# Patient Record
Sex: FEMALE | Race: WHITE | Employment: FULL TIME | URBAN - METROPOLITAN AREA
[De-identification: names, ages, dates, MRNs, and addresses within clinical notes are randomized per-mention and may not be internally consistent; named-entity substitution may affect disease eponyms.]

---

## 2017-01-24 ENCOUNTER — OFFICE VISIT (OUTPATIENT)
Dept: SURGERY | Facility: CLINIC | Age: 42
End: 2017-01-24

## 2017-01-24 VITALS
TEMPERATURE: 98 F | HEART RATE: 85 BPM | DIASTOLIC BLOOD PRESSURE: 82 MMHG | RESPIRATION RATE: 16 BRPM | WEIGHT: 173.63 LBS | BODY MASS INDEX: 27.91 KG/M2 | HEIGHT: 66 IN | SYSTOLIC BLOOD PRESSURE: 123 MMHG

## 2017-01-24 DIAGNOSIS — R92.8 ABNORMAL MAMMOGRAM OF RIGHT BREAST: Primary | ICD-10-CM

## 2017-01-24 PROCEDURE — 99244 OFF/OP CNSLTJ NEW/EST MOD 40: CPT | Performed by: SURGERY

## 2017-01-24 NOTE — PROGRESS NOTES
Breast Surgery New Patient Consultation    This is the first visit for this 37year old woman, referred by Katy Rashid, who presents for evaluation of abnormal breast imaging.     History of Present Illness:   Ms. Vielka Guzman is a 37year old woman wh Benjamin Bains*    Rash    Comment:CAPS    Family History:   Family History   Problem Relation Age of Onset   • Acute MI[other] [OTHER] Maternal Grandfather    • alzheimer's disease[other] [OTHER] Maternal Grandmother    • emphysema[other] [OTHER] Paternal Gra vomiting, dark or bloody stools, constipation, yellowing of the skin, indigestion, nausea, change in bowel habits, diarrhea, abdominal pain or vomiting blood.      Genitourinary:  The patient denies +frequent urination, needing to get up at night to urinate midline. Conjunctiva are clear, non-icteric. Chest: The chest expands symmetrically. The lungs are clear to auscultation. Heart: The rhythm is regular. There are no murmurs, rubs, gallops or thrills.     Breasts:  Her breasts are symmetrical.  Right 2017 to restart an annual screening regimen. I explained that the 3D will improve accuracy and decrease the recall rate. She was given ample opportunity for questions and those questions were answered to her satisfaction.  She has been  encouraged to contac

## 2017-01-24 NOTE — PROGRESS NOTES
Patient presents with:  Consult: New consult, referred by Qi GARCIA for abnormal mammogram    Verified and updated allergy and medication list.     LMP: 01/12/17      Age of menarche: 16  Number of pregnancies:2  Age at 4st full term delivery: 33

## 2017-03-15 ENCOUNTER — OFFICE VISIT (OUTPATIENT)
Dept: FAMILY MEDICINE CLINIC | Facility: CLINIC | Age: 42
End: 2017-03-15

## 2017-03-15 VITALS
BODY MASS INDEX: 26.68 KG/M2 | RESPIRATION RATE: 16 BRPM | TEMPERATURE: 98 F | WEIGHT: 170 LBS | HEART RATE: 100 BPM | HEIGHT: 67 IN | OXYGEN SATURATION: 99 % | SYSTOLIC BLOOD PRESSURE: 114 MMHG | DIASTOLIC BLOOD PRESSURE: 74 MMHG

## 2017-03-15 DIAGNOSIS — J02.9 SORE THROAT: ICD-10-CM

## 2017-03-15 DIAGNOSIS — J02.9 PHARYNGITIS, UNSPECIFIED ETIOLOGY: Primary | ICD-10-CM

## 2017-03-15 LAB — CONTROL LINE PRESENT WITH A CLEAR BACKGROUND (YES/NO): YES YES/NO

## 2017-03-15 PROCEDURE — 87880 STREP A ASSAY W/OPTIC: CPT | Performed by: NURSE PRACTITIONER

## 2017-03-15 PROCEDURE — 87081 CULTURE SCREEN ONLY: CPT | Performed by: NURSE PRACTITIONER

## 2017-03-15 PROCEDURE — 99213 OFFICE O/P EST LOW 20 MIN: CPT | Performed by: NURSE PRACTITIONER

## 2017-03-15 NOTE — PROGRESS NOTES
CHIEF COMPLAINT:   Patient presents with:  Sore Throat: body aches        HPI:   Ellie Goldsmith is a 37year old female presents to clinic with complaint of sore throat. Patient has had for 1 days. Symptoms have been unchanged since onset.   Patient report THROAT: oral mucosa pink, moist. Posterior pharynx erythematous and injected. no exudates. Tonsils 1/4. Breath not malodorous   NECK: supple  LUNGS: clear to auscultation bilaterally, no wheezes or rhonchi. Breathing is non labored.   CARDIO: RRR without m A test has been done to find out whether you (or your child, if your child is the patient) have strep throat. Call this facility or your healthcare provider if you were not given your test results.  If the test is positive for strep infection, you will need · Use throat lozenges or numbing throat sprays to help reduce pain. Gargling with warm salt water will also help reduce throat pain. For this, dissolve 1/2 teaspoon of salt in 1 glass of warm water.  To help soothe a sore throat, children can sip on juice o

## 2017-03-15 NOTE — PATIENT INSTRUCTIONS
Pharyngitis (Sore Throat), Report Pending    Pharyngitis (sore throat) is often due to a virus. It can also be caused by the streptococcus, or strep, bacterium, often called strep throat.  Both viral and strep infections can cause throat pain that is wors · For children: Use acetaminophen for fever, fussiness, or discomfort.  In infants older than 10months of age, you may use ibuprofen instead of acetaminophen. Talk with your child's healthcare provider before giving these medicines if your child has chronic · Signs of dehydration (very dark urine or no urine, sunken eyes, dizziness)  · Trouble breathing or noisy breathing  · Muffled voice  · New rash  · Child appears to be getting sicker  Date Last Reviewed: 4/13/2015  © 2520-4912 [employer] [address]. 7

## 2017-03-24 ENCOUNTER — OFFICE VISIT (OUTPATIENT)
Dept: FAMILY MEDICINE CLINIC | Facility: CLINIC | Age: 42
End: 2017-03-24

## 2017-03-24 VITALS
RESPIRATION RATE: 18 BRPM | WEIGHT: 170 LBS | SYSTOLIC BLOOD PRESSURE: 120 MMHG | DIASTOLIC BLOOD PRESSURE: 66 MMHG | TEMPERATURE: 98 F | OXYGEN SATURATION: 98 % | HEART RATE: 79 BPM | HEIGHT: 66.5 IN | BODY MASS INDEX: 27 KG/M2

## 2017-03-24 DIAGNOSIS — R35.0 FREQUENCY OF URINATION: ICD-10-CM

## 2017-03-24 DIAGNOSIS — N30.01 ACUTE CYSTITIS WITH HEMATURIA: Primary | ICD-10-CM

## 2017-03-24 LAB
APPEARANCE: CLEAR
PH, URINE: 5.5 (ref 4.5–8)
SPECIFIC GRAVITY: 1.02 (ref 1–1.03)
URINE-COLOR: YELLOW
UROBILINOGEN,SEMI-QN: 0.2 MG/DL (ref 0–1.9)

## 2017-03-24 PROCEDURE — 99213 OFFICE O/P EST LOW 20 MIN: CPT | Performed by: FAMILY MEDICINE

## 2017-03-24 PROCEDURE — 87086 URINE CULTURE/COLONY COUNT: CPT | Performed by: FAMILY MEDICINE

## 2017-03-24 PROCEDURE — 81003 URINALYSIS AUTO W/O SCOPE: CPT | Performed by: FAMILY MEDICINE

## 2017-03-24 RX ORDER — NITROFURANTOIN 25; 75 MG/1; MG/1
100 CAPSULE ORAL 2 TIMES DAILY
Qty: 14 CAPSULE | Refills: 0 | Status: SHIPPED | OUTPATIENT
Start: 2017-03-24 | End: 2017-03-31

## 2017-03-24 NOTE — PATIENT INSTRUCTIONS
Take antibiotics with food and plenty of water. Eat yogurt daily or use probiotics. (Betty Espinoza is a good example of an OTC probiotic)  Make sure to finish the entire antibiotic treatment.   We will send the urine specimen for culture and call you in 2-3 days w

## 2017-03-25 NOTE — PROGRESS NOTES
Geovanna Padilla is a 40year old female. HPI:   Patient presents with symptoms of UTI for 3 days. Complaining of urinary frequency, urgency, dysuria, mild suprapubic pain. .   Denies back pain, fever, hematuria.   Pt has remote history of UTI in past. Ref Range   GLUCOSE (URINE DIPSTICK) neg Negative mg/dL   BILIRUBIN neg Negative   KETONES (URINE DIPSTICK) neg Negative mg/dL   SPECIFIC GRAVITY 1.025 1.005 - 1.030   OCCULT BLOOD moderate Negative   PH, URINE 5.5 4.5 - 8.0   PROTEIN (URINE DIPSTICK) neg

## 2017-03-27 NOTE — PROGRESS NOTES
Quick Note:    Called pt to notify of lab results. Detailed msg left per pt consent.  Pt to call back or follow-up if symptoms increase or persist.  ______

## 2017-03-30 ENCOUNTER — OFFICE VISIT (OUTPATIENT)
Dept: INTERNAL MEDICINE CLINIC | Facility: CLINIC | Age: 42
End: 2017-03-30

## 2017-03-30 VITALS
DIASTOLIC BLOOD PRESSURE: 66 MMHG | HEIGHT: 66.5 IN | TEMPERATURE: 98 F | WEIGHT: 174 LBS | BODY MASS INDEX: 27.64 KG/M2 | SYSTOLIC BLOOD PRESSURE: 108 MMHG | RESPIRATION RATE: 16 BRPM | HEART RATE: 64 BPM

## 2017-03-30 DIAGNOSIS — R35.0 URINARY FREQUENCY: ICD-10-CM

## 2017-03-30 DIAGNOSIS — R30.0 DYSURIA: Primary | ICD-10-CM

## 2017-03-30 LAB
APPEARANCE: CLEAR
BILIRUBIN: NEGATIVE
GLUCOSE (URINE DIPSTICK): NEGATIVE MG/DL
KETONES (URINE DIPSTICK): NEGATIVE MG/DL
MULTISTIX LOT#: ABNORMAL NUMERIC
NITRITE, URINE: NEGATIVE
PH, URINE: 7 (ref 4.5–8)
PROTEIN (URINE DIPSTICK): NEGATIVE MG/DL
SPECIFIC GRAVITY: 1.01 (ref 1–1.03)
URINE-COLOR: YELLOW
UROBILINOGEN,SEMI-QN: 0.2 MG/DL (ref 0–1.9)

## 2017-03-30 PROCEDURE — 87510 GARDNER VAG DNA DIR PROBE: CPT | Performed by: NURSE PRACTITIONER

## 2017-03-30 PROCEDURE — 87086 URINE CULTURE/COLONY COUNT: CPT | Performed by: NURSE PRACTITIONER

## 2017-03-30 PROCEDURE — 87660 TRICHOMONAS VAGIN DIR PROBE: CPT | Performed by: NURSE PRACTITIONER

## 2017-03-30 PROCEDURE — 99213 OFFICE O/P EST LOW 20 MIN: CPT | Performed by: NURSE PRACTITIONER

## 2017-03-30 PROCEDURE — 87480 CANDIDA DNA DIR PROBE: CPT | Performed by: NURSE PRACTITIONER

## 2017-03-30 PROCEDURE — 99000 SPECIMEN HANDLING OFFICE-LAB: CPT | Performed by: NURSE PRACTITIONER

## 2017-03-30 PROCEDURE — 81003 URINALYSIS AUTO W/O SCOPE: CPT | Performed by: NURSE PRACTITIONER

## 2017-03-30 RX ORDER — CIPROFLOXACIN 500 MG/1
500 TABLET, FILM COATED ORAL 2 TIMES DAILY
Qty: 14 TABLET | Refills: 0 | Status: SHIPPED | OUTPATIENT
Start: 2017-03-30 | End: 2017-05-03

## 2017-03-30 NOTE — PROGRESS NOTES
Darek Naik is a 40year old female. Patient presents with:  Dysuria: Pt c/o urinary urgency and frequency, slight pain L side abd. Symptoms have been going on for weeks. Pt went to [facility] last week, urine culture negative.  Pt has one more day to go on abx or equivalent per week       Comment: cage 1/18/16    Family History   Problem Relation Age of Onset   • Acute MI[other] Gwenevere Carlyle Maternal Grandfather    • alzheimer's disease[other] [OTHER] Maternal Grandmother    • emphysema[other] [OTHER] Paternal Grandm & Refills for this Visit:  Signed Prescriptions Disp Refills    Ciprofloxacin HCl 500 MG Oral Tab 14 tablet 0      Sig: Take 1 tablet (500 mg total) by mouth 2 (two) times daily. Imaging & Consults:  None    No Follow-up on file.   There are no Pa

## 2017-04-04 ENCOUNTER — TELEPHONE (OUTPATIENT)
Dept: INTERNAL MEDICINE CLINIC | Facility: CLINIC | Age: 42
End: 2017-04-04

## 2017-04-04 DIAGNOSIS — R39.15 URGENCY OF URINATION: Primary | ICD-10-CM

## 2017-04-04 DIAGNOSIS — R35.0 FREQUENCY OF URINATION: ICD-10-CM

## 2017-04-14 ENCOUNTER — TELEPHONE (OUTPATIENT)
Dept: INTERNAL MEDICINE CLINIC | Facility: CLINIC | Age: 42
End: 2017-04-14

## 2017-04-14 NOTE — TELEPHONE ENCOUNTER
S/w pt. States she is not having urinary concerns at this time. She felt much of it could have been related to constipation. She does occasionally note some \"bladder pressure. \" Seeing Dr. Zahra Marie next month.  She is now questioning if she should be seeing her

## 2017-04-14 NOTE — TELEPHONE ENCOUNTER
Patient notified to take Miralax otc daily, can add prunes daily, Senna is used PRN until regular BM then stop, can add metamucil or benefiber daily . Pt notified if not resolved or persistent issue then SD would like to see pt for ov.   Pt verbalizes unde

## 2017-04-14 NOTE — TELEPHONE ENCOUNTER
miralax daily and can also add prunes daily. Senna prn until regular bm then stop. Add metamucil or benefiber daily as well.    If not resolved or persistent, see me and will likely need to see GI

## 2017-04-26 ENCOUNTER — LAB ENCOUNTER (OUTPATIENT)
Dept: LAB | Facility: HOSPITAL | Age: 42
End: 2017-04-26
Attending: NURSE PRACTITIONER
Payer: COMMERCIAL

## 2017-04-26 DIAGNOSIS — R39.15 URGENCY OF URINATION: ICD-10-CM

## 2017-04-26 DIAGNOSIS — R35.0 FREQUENCY OF URINATION: ICD-10-CM

## 2017-04-26 PROCEDURE — 81001 URINALYSIS AUTO W/SCOPE: CPT

## 2017-04-26 PROCEDURE — 87086 URINE CULTURE/COLONY COUNT: CPT

## 2017-05-08 ENCOUNTER — LAB ENCOUNTER (OUTPATIENT)
Dept: LAB | Facility: HOSPITAL | Age: 42
End: 2017-05-08
Attending: UROLOGY
Payer: COMMERCIAL

## 2017-05-08 DIAGNOSIS — N39.0 URINARY TRACT INFECTION, SITE UNSPECIFIED: ICD-10-CM

## 2017-05-08 PROCEDURE — 81001 URINALYSIS AUTO W/SCOPE: CPT

## 2017-05-08 PROCEDURE — 87086 URINE CULTURE/COLONY COUNT: CPT

## 2017-05-09 PROBLEM — R31.29 MICROSCOPIC HEMATURIA: Status: ACTIVE | Noted: 2017-05-09

## 2017-05-12 PROCEDURE — 87086 URINE CULTURE/COLONY COUNT: CPT | Performed by: EMERGENCY MEDICINE

## 2017-05-13 ENCOUNTER — LAB ENCOUNTER (OUTPATIENT)
Dept: LAB | Facility: HOSPITAL | Age: 42
End: 2017-05-13
Attending: NURSE PRACTITIONER
Payer: COMMERCIAL

## 2017-05-13 DIAGNOSIS — E53.8 VITAMIN B 12 DEFICIENCY: ICD-10-CM

## 2017-05-13 DIAGNOSIS — K59.00 CONSTIPATION, UNSPECIFIED CONSTIPATION TYPE: ICD-10-CM

## 2017-05-13 PROCEDURE — 80053 COMPREHEN METABOLIC PANEL: CPT

## 2017-05-13 PROCEDURE — 82607 VITAMIN B-12: CPT

## 2017-05-13 PROCEDURE — 36415 COLL VENOUS BLD VENIPUNCTURE: CPT

## 2017-05-13 PROCEDURE — 85025 COMPLETE CBC W/AUTO DIFF WBC: CPT

## 2017-05-13 PROCEDURE — 84443 ASSAY THYROID STIM HORMONE: CPT

## 2017-05-13 PROCEDURE — 83516 IMMUNOASSAY NONANTIBODY: CPT

## 2017-05-13 PROCEDURE — 82784 ASSAY IGA/IGD/IGG/IGM EACH: CPT

## 2017-05-13 PROCEDURE — 84439 ASSAY OF FREE THYROXINE: CPT

## 2017-05-16 PROBLEM — N20.1 RIGHT URETERAL STONE: Status: ACTIVE | Noted: 2017-05-16

## 2017-05-16 NOTE — PROGRESS NOTES
Quick Note:    Yes, would suggest we repeat in 6 mos even if she is not taking supplement as it could decline again.  If it is normal on no supplement next check we could consider resolved as persistent normal values on no supplement.  ______

## 2017-05-22 ENCOUNTER — TELEPHONE (OUTPATIENT)
Dept: INTERNAL MEDICINE CLINIC | Facility: CLINIC | Age: 42
End: 2017-05-22

## 2017-05-22 NOTE — TELEPHONE ENCOUNTER
Pt called back asking if someone could read her the results of her CT scan-she is confused by the results-please call her back today at above number

## 2017-05-24 NOTE — TELEPHONE ENCOUNTER
.Called pt to advise info per AS. Pt verbalized understanding and states that she was seen by urologist- Pt had all her questions answered. Nothing further at this time.

## 2017-05-25 ENCOUNTER — TELEPHONE (OUTPATIENT)
Dept: INTERNAL MEDICINE CLINIC | Facility: CLINIC | Age: 42
End: 2017-05-25

## 2017-05-25 ENCOUNTER — HOSPITAL ENCOUNTER (EMERGENCY)
Facility: HOSPITAL | Age: 42
Discharge: HOME | End: 2017-05-26
Attending: EMERGENCY MEDICINE
Payer: COMMERCIAL

## 2017-05-25 ENCOUNTER — APPOINTMENT (OUTPATIENT)
Dept: ULTRASOUND IMAGING | Facility: HOSPITAL | Age: 42
End: 2017-05-25
Attending: EMERGENCY MEDICINE
Payer: COMMERCIAL

## 2017-05-25 DIAGNOSIS — R10.31 ABDOMINAL PAIN, RIGHT LOWER QUADRANT: Primary | ICD-10-CM

## 2017-05-25 PROCEDURE — 96374 THER/PROPH/DIAG INJ IV PUSH: CPT

## 2017-05-25 PROCEDURE — 96361 HYDRATE IV INFUSION ADD-ON: CPT

## 2017-05-25 PROCEDURE — 76830 TRANSVAGINAL US NON-OB: CPT | Performed by: EMERGENCY MEDICINE

## 2017-05-25 PROCEDURE — 81025 URINE PREGNANCY TEST: CPT

## 2017-05-25 PROCEDURE — 76856 US EXAM PELVIC COMPLETE: CPT | Performed by: EMERGENCY MEDICINE

## 2017-05-25 PROCEDURE — 85025 COMPLETE CBC W/AUTO DIFF WBC: CPT | Performed by: EMERGENCY MEDICINE

## 2017-05-25 PROCEDURE — 93975 VASCULAR STUDY: CPT | Performed by: EMERGENCY MEDICINE

## 2017-05-25 PROCEDURE — 99284 EMERGENCY DEPT VISIT MOD MDM: CPT

## 2017-05-25 PROCEDURE — 96375 TX/PRO/DX INJ NEW DRUG ADDON: CPT

## 2017-05-25 PROCEDURE — 80053 COMPREHEN METABOLIC PANEL: CPT | Performed by: EMERGENCY MEDICINE

## 2017-05-25 PROCEDURE — 81001 URINALYSIS AUTO W/SCOPE: CPT | Performed by: EMERGENCY MEDICINE

## 2017-05-25 RX ORDER — ONDANSETRON 2 MG/ML
4 INJECTION INTRAMUSCULAR; INTRAVENOUS ONCE
Status: COMPLETED | OUTPATIENT
Start: 2017-05-25 | End: 2017-05-25

## 2017-05-25 RX ORDER — KETOROLAC TROMETHAMINE 30 MG/ML
30 INJECTION, SOLUTION INTRAMUSCULAR; INTRAVENOUS ONCE
Status: COMPLETED | OUTPATIENT
Start: 2017-05-25 | End: 2017-05-25

## 2017-05-26 VITALS
RESPIRATION RATE: 16 BRPM | DIASTOLIC BLOOD PRESSURE: 63 MMHG | OXYGEN SATURATION: 97 % | SYSTOLIC BLOOD PRESSURE: 105 MMHG | TEMPERATURE: 98 F | WEIGHT: 162 LBS | HEART RATE: 69 BPM | BODY MASS INDEX: 26 KG/M2

## 2017-05-26 DIAGNOSIS — E53.8 VITAMIN B12 DEFICIENCY: Primary | ICD-10-CM

## 2017-05-26 NOTE — ED PROVIDER NOTES
Patient Seen in: [facility] Emergency Department    History   Patient presents with:  Abdomen/Flank Pain (GI/)    Stated Complaint: abd pain     HPI    42-year-old woman with right-sided kidney stone for about 2 months.   She had a CT on May 12 raquel • HTN[other] [OTHER] Maternal Grandfather    • leukemia[other] [OTHER] Father    • Cancer Father 61     Leukemia   • leukemia[other] [OTHER] Paternal Grandfather    • Cancer Paternal Grandfather      unk         Smoking Status: Former Smoker Neurological: She is alert and oriented to person, place, and time. She exhibits normal muscle tone. Coordination normal.   Skin: Skin is warm and dry. No rash noted. Psychiatric: She has a normal mood and affect.  Her behavior is normal.   Nursing note a CT scan results were reviewed and discussed with Dr. Kajal Valero. He feels that there is unlikely a genitourinary cause of her symptoms.   I did obtain a pelvic ultrasound at this was unremarkable with the exception of a 1.5 cm follicle in the right ovary and sm

## 2017-05-26 NOTE — ED INITIAL ASSESSMENT (HPI)
42YF c/c of abd pain Pt state she had surgery for a kidney stone on Monday but was unable to find the stone Pt state that today she had an outpt ct for the same Pt state she here now for increased R sided abd pain

## 2017-07-25 ENCOUNTER — HOSPITAL ENCOUNTER (OUTPATIENT)
Dept: MAMMOGRAPHY | Facility: HOSPITAL | Age: 42
Discharge: HOME | End: 2017-07-25
Attending: SURGERY
Payer: COMMERCIAL

## 2017-07-25 DIAGNOSIS — R92.8 ABNORMAL MAMMOGRAM: ICD-10-CM

## 2017-07-25 DIAGNOSIS — R92.8 ABNORMAL MAMMOGRAM OF RIGHT BREAST: ICD-10-CM

## 2017-07-25 PROCEDURE — 77061 BREAST TOMOSYNTHESIS UNI: CPT | Performed by: SURGERY

## 2017-07-25 PROCEDURE — 76642 ULTRASOUND BREAST LIMITED: CPT | Performed by: SURGERY

## 2017-07-25 PROCEDURE — 77065 DX MAMMO INCL CAD UNI: CPT | Performed by: SURGERY

## 2017-08-08 PROCEDURE — 82507 ASSAY OF CITRATE: CPT | Performed by: UROLOGY

## 2017-08-08 PROCEDURE — 82436 ASSAY OF URINE CHLORIDE: CPT | Performed by: UROLOGY

## 2017-08-08 PROCEDURE — 83945 ASSAY OF OXALATE: CPT | Performed by: UROLOGY

## 2017-08-08 PROCEDURE — 82340 ASSAY OF CALCIUM IN URINE: CPT | Performed by: UROLOGY

## 2017-08-08 PROCEDURE — 84392 ASSAY OF URINE SULFATE: CPT | Performed by: UROLOGY

## 2017-08-25 ENCOUNTER — TELEPHONE (OUTPATIENT)
Dept: INTERNAL MEDICINE CLINIC | Facility: CLINIC | Age: 42
End: 2017-08-25

## 2017-09-17 ENCOUNTER — OFFICE VISIT (OUTPATIENT)
Dept: FAMILY MEDICINE CLINIC | Facility: CLINIC | Age: 42
End: 2017-09-17

## 2017-09-17 VITALS
OXYGEN SATURATION: 98 % | TEMPERATURE: 99 F | HEIGHT: 67 IN | DIASTOLIC BLOOD PRESSURE: 61 MMHG | HEART RATE: 114 BPM | SYSTOLIC BLOOD PRESSURE: 108 MMHG

## 2017-09-17 DIAGNOSIS — J02.9 ACUTE PHARYNGITIS, UNSPECIFIED ETIOLOGY: Primary | ICD-10-CM

## 2017-09-17 LAB
CONTROL LINE PRESENT WITH A CLEAR BACKGROUND (YES/NO): YES YES/NO
STREP GRP A CUL-SCR: NEGATIVE

## 2017-09-17 PROCEDURE — 87081 CULTURE SCREEN ONLY: CPT | Performed by: PHYSICIAN ASSISTANT

## 2017-09-17 PROCEDURE — 99213 OFFICE O/P EST LOW 20 MIN: CPT | Performed by: PHYSICIAN ASSISTANT

## 2017-09-17 PROCEDURE — 87880 STREP A ASSAY W/OPTIC: CPT | Performed by: PHYSICIAN ASSISTANT

## 2017-09-17 NOTE — PROGRESS NOTES
CHIEF COMPLAINT:   Patient presents with:  Sore Throat    HPI:   Marzena Kinney is a 40year old female presents to clinic with complaint of sore throat. Patient has had for 2 days.  Patient/parent reports following associated symptoms: none, no fever, cou NOSE: nostrils patent, no exudates, nasal mucosa pink and noninflamed  THROAT: oral mucosa pink, moist. Posterior pharynx mildly injected. Tonsils 1+ and not erythematous. No exudates. LUNGS: clear to auscultation bilaterally, no wheezes or rhonchi.  Karla · 1/4 teaspoon of salt in 1/2 cup of warm water  · An over-the-counter anesthetic gargle  Use medicine for more relief  Over-the-counter medicine can reduce sore throat symptoms.  Ask your pharmacist if you have questions about which medicine to use:  · Eas Pharyngitis (sore throat) is often due to a virus. It can also be caused by the streptococcus, or strep, bacterium, often called strep throat.  Both viral and strep infections can cause throat pain that is worse when swallowing, aching all over with headach · For children: Use acetaminophen for fever, fussiness, or discomfort.  In infants older than 10months of age, you may use ibuprofen instead of acetaminophen. Talk with your child's healthcare provider before giving these medicines if your child has chronic · Signs of dehydration (very dark urine or no urine, sunken eyes, dizziness)  · Trouble breathing or noisy breathing  · Muffled voice  · New rash  · Child appears to be getting sicker  Date Last Reviewed: 4/13/2015  © 1357-4072 [employer] [address]. 7 · Do you have any other symptoms, such as body aches, fever, or cough? · Does your sore throat recur? If so, how often? How many days of school or work have you missed because of a sore throat? · Do you have trouble eating or swallowing?   · Have you been If your sore throat is due to a bacterial infection, antibiotics may speed healing and prevent complications.  Although group A streptococcus (\"strep throat\" or GAS) is the major treatable infection for a sore throat, GAS causes only 5% to 15% of sore thr © 5177-6263 The [address] [place] [address], [address] [place]. All rights reserved. This information is not intended as a substitute for professional medical care. Always follow your healthcare professional's instructions.             The

## 2017-09-17 NOTE — PATIENT INSTRUCTIONS
Self-Care for Sore Throats  Sore throats happen for many reasons, such as colds, allergies, and infections caused by viruses or bacteria. In any case, your throat becomes red and sore.  Your goal for self-care is to reduce your discomfort while giving you Contact your healthcare provider if you have:  · A temperature over 101°F (38.3°C)  · White spots on the throat  · Great difficulty swallowing  · Trouble breathing  · A skin rash  · Recent exposure to someone else with strep bacteria  · Severe hoarseness a · Take the antibiotic medicine for the full 10 days, even if you feel better. This is very important to make sure the infection is treated.  It is also important to prevent drug-resistant germs from developing. If you were given an antibiotic shot, you won' ¨ Your child is 3years old or older and has a fever of 100.4°F (38°C) that continues for more than 3 days.   · New or worsening ear pain, sinus pain, or headache  · Painful lumps in the back of neck  · Stiff neck  · Lymph nodes are getting larger  · Inabil A medical evaluation can help find the cause of your sore throat. It can also help your healthcare provider choose the best treatment for you. The evaluation may include a health history, physical exam, and diagnostic tests.   Health history  Your healthcar · Gargle with warm saltwater (1 teaspoon of salt to 8 ounces of warm water). · Use a humidifier to keep air moist and relieve throat dryness. · Try over-the-counter pain relievers such as acetaminophen or ibuprofen.  Use as directed, and don’t exceed the · A skin rash, hives, or wheezing develops. Any of these could signal an allergic reaction to antibiotics. · Symptoms don’t improve within a week. · Symptoms don’t improve within 2 to 3 days of starting antibiotics.    Date Last Reviewed: 10/1/2016  © 200

## 2017-09-19 PROCEDURE — 87510 GARDNER VAG DNA DIR PROBE: CPT | Performed by: OBSTETRICS & GYNECOLOGY

## 2017-09-19 PROCEDURE — 87624 HPV HI-RISK TYP POOLED RSLT: CPT | Performed by: OBSTETRICS & GYNECOLOGY

## 2017-09-19 PROCEDURE — 87660 TRICHOMONAS VAGIN DIR PROBE: CPT | Performed by: OBSTETRICS & GYNECOLOGY

## 2017-09-19 PROCEDURE — 88175 CYTOPATH C/V AUTO FLUID REDO: CPT | Performed by: OBSTETRICS & GYNECOLOGY

## 2017-09-19 PROCEDURE — 87480 CANDIDA DNA DIR PROBE: CPT | Performed by: OBSTETRICS & GYNECOLOGY

## 2017-09-22 ENCOUNTER — OFFICE VISIT (OUTPATIENT)
Dept: INTERNAL MEDICINE CLINIC | Facility: CLINIC | Age: 42
End: 2017-09-22

## 2017-09-22 VITALS
SYSTOLIC BLOOD PRESSURE: 96 MMHG | HEIGHT: 67 IN | RESPIRATION RATE: 18 BRPM | WEIGHT: 166 LBS | TEMPERATURE: 98 F | HEART RATE: 80 BPM | BODY MASS INDEX: 26.06 KG/M2 | DIASTOLIC BLOOD PRESSURE: 62 MMHG

## 2017-09-22 DIAGNOSIS — J02.9 SORE THROAT: Primary | ICD-10-CM

## 2017-09-22 PROCEDURE — 99213 OFFICE O/P EST LOW 20 MIN: CPT | Performed by: NURSE PRACTITIONER

## 2017-09-22 RX ORDER — AMOXICILLIN AND CLAVULANATE POTASSIUM 875; 125 MG/1; MG/1
1 TABLET, FILM COATED ORAL 2 TIMES DAILY
Qty: 20 TABLET | Refills: 0 | Status: SHIPPED | OUTPATIENT
Start: 2017-09-22 | End: 2017-10-02

## 2017-09-22 NOTE — PROGRESS NOTES
Patient presents with:  Sore Throat: since last friday. Lab Results: would like to review labs as well. HPI:  Presents with 1 week history of sore throat, seems to be worsening as time passes.  Stated also had fever at onset of symptoms but this has r motion. Neck supple. Lymphadenopathy: No cervical adenopathy. Cardiovascular: Normal rate, regular rhythm. No murmur. Pulmonary/Chest: No respiratory distress. Effort normal. Breath sounds clear bilaterally.  No wheezes, rhonchi or rales    A/P:    So

## 2017-09-22 NOTE — PATIENT INSTRUCTIONS
Gargle with warm salt water solution 3-5 times daily. Dissolve 1/2 teaspoon salt in half cup of warm tap water. Gargle and spit. Use a humidifier in your room at night.      Try a premixed saline nasal spray, available over the counter

## 2017-09-25 ENCOUNTER — TELEPHONE (OUTPATIENT)
Dept: INTERNAL MEDICINE CLINIC | Facility: CLINIC | Age: 42
End: 2017-09-25

## 2017-09-27 NOTE — TELEPHONE ENCOUNTER
Called pt to advise info per SD- that results were WNL and pt is due for OV. Results sent to scan. Pt states that she was seen on 9/22/17 by Chandana Mercado and was rx antibiotic.   Pt believes she is developing a yeast infection- pt c/o itchiness that started yesterd

## 2017-09-27 NOTE — TELEPHONE ENCOUNTER
Can trial Monistat OTC, follow instructions on packaging. If no relief for vaginal symptoms will need OV. Also, good idea to take her choice of OTC probiotics. Thanks.

## 2017-10-05 ENCOUNTER — OFFICE VISIT (OUTPATIENT)
Dept: INTERNAL MEDICINE CLINIC | Facility: CLINIC | Age: 42
End: 2017-10-05

## 2017-10-05 VITALS
TEMPERATURE: 98 F | BODY MASS INDEX: 26.21 KG/M2 | SYSTOLIC BLOOD PRESSURE: 112 MMHG | HEIGHT: 67 IN | DIASTOLIC BLOOD PRESSURE: 70 MMHG | RESPIRATION RATE: 16 BRPM | WEIGHT: 167 LBS | HEART RATE: 72 BPM

## 2017-10-05 DIAGNOSIS — B00.2 ORAL HERPES: Primary | ICD-10-CM

## 2017-10-05 DIAGNOSIS — Z23 FLU VACCINE NEED: ICD-10-CM

## 2017-10-05 PROCEDURE — 90471 IMMUNIZATION ADMIN: CPT | Performed by: PHYSICIAN ASSISTANT

## 2017-10-05 PROCEDURE — 90686 IIV4 VACC NO PRSV 0.5 ML IM: CPT | Performed by: PHYSICIAN ASSISTANT

## 2017-10-05 PROCEDURE — 99213 OFFICE O/P EST LOW 20 MIN: CPT | Performed by: PHYSICIAN ASSISTANT

## 2017-10-05 RX ORDER — ACYCLOVIR 400 MG/1
800 TABLET ORAL 2 TIMES DAILY
Qty: 20 TABLET | Refills: 1 | Status: SHIPPED | OUTPATIENT
Start: 2017-10-05 | End: 2017-10-10

## 2017-10-05 RX ORDER — DOCOSANOL 100 MG/G
1 CREAM TOPICAL
Qty: 2 G | Refills: 1 | Status: SHIPPED | OUTPATIENT
Start: 2017-10-05 | End: 2017-12-06 | Stop reason: ALTCHOICE

## 2017-10-05 NOTE — PROGRESS NOTES
Patient presents with:  Mouth Cold Sores (respiratory/integumentary): x 1 day; patient gets sores every few years  ROOM 6      HPI:  Pt presents with c/o recurrence of cold sore on her lower lip X 1 day. Pt gets these once a year or so.   Usually able to t small vessicles noted to medial lower lip      A/P:    Oral herpes  (primary encounter diagnosis) - Abreva and Acyclovir. F/U if not resolving or new problems/concerns.   Flu vaccine need      Orders Placed This Encounter      Flulaval 0.5 ml >= 6 months Q

## 2017-11-20 RX ORDER — LEVONORGESTREL AND ETHINYL ESTRADIOL 0.1-0.02MG
KIT ORAL
Qty: 84 TABLET | Refills: 0 | OUTPATIENT
Start: 2017-11-20

## 2017-12-06 ENCOUNTER — OFFICE VISIT (OUTPATIENT)
Dept: INTERNAL MEDICINE CLINIC | Facility: CLINIC | Age: 42
End: 2017-12-06

## 2017-12-06 VITALS
WEIGHT: 171 LBS | SYSTOLIC BLOOD PRESSURE: 108 MMHG | DIASTOLIC BLOOD PRESSURE: 70 MMHG | OXYGEN SATURATION: 98 % | HEART RATE: 70 BPM | BODY MASS INDEX: 26.84 KG/M2 | RESPIRATION RATE: 16 BRPM | HEIGHT: 67 IN

## 2017-12-06 DIAGNOSIS — L08.9 FINGER INFECTION: Primary | ICD-10-CM

## 2017-12-06 PROCEDURE — 99213 OFFICE O/P EST LOW 20 MIN: CPT | Performed by: NURSE PRACTITIONER

## 2017-12-06 RX ORDER — FLUCONAZOLE 150 MG/1
150 TABLET ORAL ONCE
Qty: 2 TABLET | Refills: 0 | Status: SHIPPED | OUTPATIENT
Start: 2017-12-06 | End: 2017-12-06

## 2017-12-06 RX ORDER — CEPHALEXIN 500 MG/1
500 CAPSULE ORAL 3 TIMES DAILY
Qty: 21 CAPSULE | Refills: 0 | Status: SHIPPED | OUTPATIENT
Start: 2017-12-06 | End: 2018-04-11

## 2017-12-06 NOTE — PROGRESS NOTES
Marnie Cid is a 40year old female. Patient presents with: Infection: exam rm 10      HPI:   Presents for eval of finger pain.   Unable to recall any issues or trauma recently   She noted a lump on her finger Friday and worsened over the past few days alzheimer's disease [OTHER] Maternal Grandmother    • emphysema [OTHER] Paternal Grandmother    • HTN [OTHER] Maternal Grandfather    • Cancer Paternal Grandfather      unk   • leukemia [OTHER] Paternal Grandfather         Allergies    Sporanox Shirley Cardona*

## 2017-12-11 PROBLEM — L08.9 FINGER INFECTION: Status: ACTIVE | Noted: 2017-12-11

## 2017-12-12 ENCOUNTER — HOSPITAL ENCOUNTER (OUTPATIENT)
Dept: MAMMOGRAPHY | Facility: HOSPITAL | Age: 42
Discharge: HOME | End: 2017-12-12
Attending: OBSTETRICS & GYNECOLOGY
Payer: COMMERCIAL

## 2017-12-12 DIAGNOSIS — R92.8 ABNORMAL MAMMOGRAM: ICD-10-CM

## 2017-12-12 PROCEDURE — 77062 BREAST TOMOSYNTHESIS BI: CPT | Performed by: OBSTETRICS & GYNECOLOGY

## 2017-12-12 PROCEDURE — 77066 DX MAMMO INCL CAD BI: CPT | Performed by: OBSTETRICS & GYNECOLOGY

## 2018-02-27 ENCOUNTER — OFFICE VISIT (OUTPATIENT)
Dept: INTERNAL MEDICINE CLINIC | Facility: CLINIC | Age: 43
End: 2018-02-27

## 2018-02-27 VITALS
TEMPERATURE: 99 F | BODY MASS INDEX: 28.13 KG/M2 | HEART RATE: 88 BPM | SYSTOLIC BLOOD PRESSURE: 110 MMHG | RESPIRATION RATE: 16 BRPM | WEIGHT: 175 LBS | HEIGHT: 66 IN | DIASTOLIC BLOOD PRESSURE: 60 MMHG

## 2018-02-27 DIAGNOSIS — R07.9 CHEST PAIN, UNSPECIFIED TYPE: Primary | ICD-10-CM

## 2018-02-27 DIAGNOSIS — K21.9 GASTROESOPHAGEAL REFLUX DISEASE, ESOPHAGITIS PRESENCE NOT SPECIFIED: ICD-10-CM

## 2018-02-27 PROCEDURE — 99213 OFFICE O/P EST LOW 20 MIN: CPT | Performed by: INTERNAL MEDICINE

## 2018-02-27 PROCEDURE — 93000 ELECTROCARDIOGRAM COMPLETE: CPT | Performed by: INTERNAL MEDICINE

## 2018-02-27 RX ORDER — OMEPRAZOLE 40 MG/1
40 CAPSULE, DELAYED RELEASE ORAL DAILY
Qty: 90 CAPSULE | Refills: 3 | Status: SHIPPED | OUTPATIENT
Start: 2018-02-27 | End: 2018-07-24

## 2018-02-27 NOTE — PATIENT INSTRUCTIONS
Tips to Control Acid Reflux    To control acid reflux, you’ll need to make some basic diet and lifestyle changes. The simple steps outlined below may be all you’ll need to ease discomfort. Watch what you eat  · Avoid fatty foods and spicy foods.   · Eat There are muscles (esophageal sphincters) at both ends of the tube that carries food to your stomach (the esophagus). These muscles relax to let food pass. Then they tighten to keep stomach acid down.  When the lower esophageal sphincter (LES) doesn’t tight · Don’t exercise near bedtime  · Avoid tight-fitting clothes  · Limit aspirin and ibuprofen  · Stop smoking   Date Last Reviewed: 7/1/2016  © 2477-7064 The [address]. [address] [place], [address] [place] [address]. All rights reserved.  This informat

## 2018-02-28 NOTE — PROGRESS NOTES
Brittany Okeefe  3/23/1975    Patient presents with:  Stress: AB RM 12, heart burn, tightness in chest      SUBJECTIVE   Brittany Okeefe is a 40year old female who presents with untreated acid reflux, obesity, and recent acute stressor, who presents with hear 5/22/17: CYSTOSCOPY,+URETEROSCOPY Right      Comment: Cysto, Rt URS, Rt RGP, stone was already                passed, no stent placement -   1/4/2012: ESOPHAGOSCOPY,BIOPSY   Smoking status: Former Smoker The patient is asked to return or present to the emergency room for worsening of symptoms. TODAY'S ORDERS     No orders of the defined types were placed in this encounter.       Meds & Refills:  Signed Prescriptions Disp Refills    Omeprazole 40 MG Oral Do you have to clear your throat or cough often? Are you hoarse? Do you have trouble swallowing? If you have these or other throat symptoms, you may have acid reflux. This occurs when stomach acid flows back up and irritates your throat.   Why you have thro · Do not snack before going to bed. Raise your head  Raising your head and upper body by 4 to 6 inches helps limit reflux when you’re lying down. Put blocks under the head of your bed frame to raise it.   Other changes  · Lose weight, if you need to  · Vergie Spurr © 5579-8191 The [address]. [address] [facility], [facility] [place]. All rights reserved. This information is not intended as a substitute for professional medical care. Always follow your healthcare professional's instructions.             All

## 2018-03-08 ENCOUNTER — HOSPITAL ENCOUNTER (OUTPATIENT)
Dept: CV DIAGNOSTICS | Facility: HOSPITAL | Age: 43
Discharge: HOME | End: 2018-03-08
Attending: INTERNAL MEDICINE
Payer: COMMERCIAL

## 2018-03-08 DIAGNOSIS — R07.9 CHEST PAIN, UNSPECIFIED TYPE: ICD-10-CM

## 2018-03-08 PROCEDURE — 93018 CV STRESS TEST I&R ONLY: CPT | Performed by: INTERNAL MEDICINE

## 2018-03-08 PROCEDURE — 93017 CV STRESS TEST TRACING ONLY: CPT | Performed by: INTERNAL MEDICINE

## 2018-04-11 PROCEDURE — 81015 MICROSCOPIC EXAM OF URINE: CPT | Performed by: UROLOGY

## 2018-07-21 ENCOUNTER — HOSPITAL ENCOUNTER (EMERGENCY)
Facility: HOSPITAL | Age: 43
Discharge: HOME | End: 2018-07-21
Payer: COMMERCIAL

## 2018-07-21 VITALS
DIASTOLIC BLOOD PRESSURE: 63 MMHG | BODY MASS INDEX: 27 KG/M2 | HEART RATE: 51 BPM | TEMPERATURE: 99 F | WEIGHT: 172 LBS | RESPIRATION RATE: 14 BRPM | HEIGHT: 67 IN | OXYGEN SATURATION: 96 % | SYSTOLIC BLOOD PRESSURE: 111 MMHG

## 2018-07-21 DIAGNOSIS — N23 URETER COLIC: Primary | ICD-10-CM

## 2018-07-21 DIAGNOSIS — N20.0 KIDNEY STONE: ICD-10-CM

## 2018-07-21 LAB
ANION GAP SERPL CALC-SCNC: 6 MMOL/L (ref 0–18)
BASOPHILS # BLD AUTO: 0.04 X10(3) UL (ref 0–0.1)
BASOPHILS NFR BLD AUTO: 0.4 %
BILIRUB UR QL STRIP.AUTO: NEGATIVE
BUN BLD-MCNC: 16 MG/DL (ref 8–20)
BUN/CREAT SERPL: 13.7 (ref 10–20)
CALCIUM BLD-MCNC: 8.3 MG/DL (ref 8.3–10.3)
CHLORIDE SERPL-SCNC: 107 MMOL/L (ref 101–111)
CLARITY UR REFRACT.AUTO: CLEAR
CO2 SERPL-SCNC: 24 MMOL/L (ref 22–32)
CREAT BLD-MCNC: 1.17 MG/DL (ref 0.55–1.02)
EOSINOPHIL # BLD AUTO: 0.22 X10(3) UL (ref 0–0.3)
EOSINOPHIL NFR BLD AUTO: 2.3 %
ERYTHROCYTE [DISTWIDTH] IN BLOOD BY AUTOMATED COUNT: 12.2 % (ref 11.5–16)
GLUCOSE BLD-MCNC: 99 MG/DL (ref 70–99)
GLUCOSE UR STRIP.AUTO-MCNC: NEGATIVE MG/DL
HCT VFR BLD AUTO: 38 % (ref 34–50)
HGB BLD-MCNC: 13.4 G/DL (ref 12–16)
IMMATURE GRANULOCYTE COUNT: 0.08 X10(3) UL (ref 0–1)
IMMATURE GRANULOCYTE RATIO %: 0.8 %
KETONES UR STRIP.AUTO-MCNC: NEGATIVE MG/DL
LEUKOCYTE ESTERASE UR QL STRIP.AUTO: NEGATIVE
LYMPHOCYTES # BLD AUTO: 2.19 X10(3) UL (ref 0.9–4)
LYMPHOCYTES NFR BLD AUTO: 22.5 %
MCH RBC QN AUTO: 32.1 PG (ref 27–33.2)
MCHC RBC AUTO-ENTMCNC: 35.3 G/DL (ref 31–37)
MCV RBC AUTO: 90.9 FL (ref 81–100)
MONOCYTES # BLD AUTO: 0.87 X10(3) UL (ref 0.1–1)
MONOCYTES NFR BLD AUTO: 8.9 %
NEUTROPHIL ABS PRELIM: 6.34 X10 (3) UL (ref 1.3–6.7)
NEUTROPHILS # BLD AUTO: 6.34 X10(3) UL (ref 1.3–6.7)
NEUTROPHILS NFR BLD AUTO: 65.1 %
NITRITE UR QL STRIP.AUTO: NEGATIVE
OSMOLALITY SERPL CALC.SUM OF ELEC: 285 MOSM/KG (ref 275–295)
PH UR STRIP.AUTO: 6 [PH] (ref 4.5–8)
PLATELET # BLD AUTO: 251 10(3)UL (ref 150–450)
POCT URINE PREGNANCY: NEGATIVE
POTASSIUM SERPL-SCNC: 4 MMOL/L (ref 3.6–5.1)
PROT UR STRIP.AUTO-MCNC: NEGATIVE MG/DL
RBC # BLD AUTO: 4.18 X10(6)UL (ref 3.8–5.1)
RED CELL DISTRIBUTION WIDTH-SD: 40.5 FL (ref 35.1–46.3)
SODIUM SERPL-SCNC: 137 MMOL/L (ref 136–144)
SP GR UR STRIP.AUTO: 1 (ref 1–1.03)
UROBILINOGEN UR STRIP.AUTO-MCNC: <2 MG/DL
WBC # BLD AUTO: 9.7 X10(3) UL (ref 4–13)

## 2018-07-21 PROCEDURE — 80048 BASIC METABOLIC PNL TOTAL CA: CPT

## 2018-07-21 PROCEDURE — 81001 URINALYSIS AUTO W/SCOPE: CPT

## 2018-07-21 PROCEDURE — 99284 EMERGENCY DEPT VISIT MOD MDM: CPT

## 2018-07-21 PROCEDURE — 96376 TX/PRO/DX INJ SAME DRUG ADON: CPT

## 2018-07-21 PROCEDURE — 96375 TX/PRO/DX INJ NEW DRUG ADDON: CPT

## 2018-07-21 PROCEDURE — 99285 EMERGENCY DEPT VISIT HI MDM: CPT

## 2018-07-21 PROCEDURE — 85025 COMPLETE CBC W/AUTO DIFF WBC: CPT

## 2018-07-21 PROCEDURE — 96361 HYDRATE IV INFUSION ADD-ON: CPT

## 2018-07-21 PROCEDURE — 81025 URINE PREGNANCY TEST: CPT

## 2018-07-21 PROCEDURE — 96374 THER/PROPH/DIAG INJ IV PUSH: CPT

## 2018-07-21 RX ORDER — MORPHINE SULFATE 4 MG/ML
4 INJECTION, SOLUTION INTRAMUSCULAR; INTRAVENOUS ONCE
Status: COMPLETED | OUTPATIENT
Start: 2018-07-21 | End: 2018-07-21

## 2018-07-21 RX ORDER — SODIUM CHLORIDE 9 MG/ML
INJECTION, SOLUTION INTRAVENOUS CONTINUOUS
Status: DISCONTINUED | OUTPATIENT
Start: 2018-07-21 | End: 2018-07-21

## 2018-07-21 RX ORDER — MORPHINE SULFATE 4 MG/ML
4 INJECTION, SOLUTION INTRAMUSCULAR; INTRAVENOUS EVERY 30 MIN PRN
Status: DISCONTINUED | OUTPATIENT
Start: 2018-07-21 | End: 2018-07-21

## 2018-07-21 RX ORDER — ONDANSETRON 2 MG/ML
4 INJECTION INTRAMUSCULAR; INTRAVENOUS ONCE
Status: COMPLETED | OUTPATIENT
Start: 2018-07-21 | End: 2018-07-21

## 2018-07-21 RX ORDER — OXYCODONE HYDROCHLORIDE AND ACETAMINOPHEN 5; 325 MG/1; MG/1
1-2 TABLET ORAL EVERY 4 HOURS PRN
Qty: 10 TABLET | Refills: 0 | Status: SHIPPED | OUTPATIENT
Start: 2018-07-21 | End: 2018-08-09

## 2018-07-21 RX ORDER — KETOROLAC TROMETHAMINE 30 MG/ML
10 INJECTION, SOLUTION INTRAMUSCULAR; INTRAVENOUS ONCE
Status: COMPLETED | OUTPATIENT
Start: 2018-07-21 | End: 2018-07-21

## 2018-07-21 NOTE — ED PROVIDER NOTES
Patient Seen in: [facility] Emergency Department    History   Patient presents with:  Abdomen/Flank Pain (GI/)    Stated Complaint: FLANK PAIN    HPI    Patient presents with pain at home unable to take care of with oral medication due to presumed occ      Review of Systems    Positive for stated complaint: FLANK PAIN  Other systems are as noted in HPI. Constitutional and vital signs reviewed. All other systems reviewed and negative except as noted above.     Physical Exam   ED Triage Vitals [0 DIFFERENTIAL[[id number]]                              Final result                 Please view results for these tests on the individual orders.    POCT PREGNANCY, URINE   RAINBOW DRAW BLUE   RAINBOW DRAW LAVENDER   RAINBOW DRAW LIGHT GREEN   RAINBOW DRAW GO Pain.  Qty: 10 tablet Refills: 0

## 2018-07-21 NOTE — ED INITIAL ASSESSMENT (HPI)
Patient sts has 7 mm kidney stone if left ureter. Unable to manage pain at home.  Last took norco at 9;30pm 2 tabs, and 600 mg motrin at 12:30am.

## 2018-07-23 ENCOUNTER — TELEPHONE (OUTPATIENT)
Dept: INTERNAL MEDICINE CLINIC | Facility: CLINIC | Age: 43
End: 2018-07-23

## 2018-07-23 NOTE — TELEPHONE ENCOUNTER
Patient states she's had a kidney stone since last Thursday and it is very painful. She has an appointment for surgery on Friday, but wants to know if there is someone else that Dr Cristobal Ojeda would recommend so maybe she can have surgery sooner?

## 2018-07-24 ENCOUNTER — HOSPITAL ENCOUNTER (OUTPATIENT)
Facility: HOSPITAL | Age: 43
Setting detail: OBSERVATION
Discharge: HOME | End: 2018-07-24
Attending: EMERGENCY MEDICINE | Admitting: INTERNAL MEDICINE
Payer: COMMERCIAL

## 2018-07-24 ENCOUNTER — APPOINTMENT (OUTPATIENT)
Dept: GENERAL RADIOLOGY | Facility: HOSPITAL | Age: 43
End: 2018-07-24
Attending: EMERGENCY MEDICINE
Payer: COMMERCIAL

## 2018-07-24 ENCOUNTER — ANESTHESIA (OUTPATIENT)
Dept: SURGERY | Facility: HOSPITAL | Age: 43
End: 2018-07-24

## 2018-07-24 ENCOUNTER — ANESTHESIA EVENT (OUTPATIENT)
Dept: SURGERY | Facility: HOSPITAL | Age: 43
End: 2018-07-24

## 2018-07-24 ENCOUNTER — APPOINTMENT (OUTPATIENT)
Dept: GENERAL RADIOLOGY | Facility: HOSPITAL | Age: 43
End: 2018-07-24
Attending: UROLOGY
Payer: COMMERCIAL

## 2018-07-24 ENCOUNTER — SURGERY (OUTPATIENT)
Age: 43
End: 2018-07-24

## 2018-07-24 VITALS
TEMPERATURE: 98 F | DIASTOLIC BLOOD PRESSURE: 73 MMHG | HEIGHT: 67 IN | HEART RATE: 65 BPM | SYSTOLIC BLOOD PRESSURE: 124 MMHG | BODY MASS INDEX: 28.08 KG/M2 | RESPIRATION RATE: 18 BRPM | WEIGHT: 178.88 LBS | OXYGEN SATURATION: 99 %

## 2018-07-24 DIAGNOSIS — N20.0 KIDNEY STONE ON LEFT SIDE: Primary | ICD-10-CM

## 2018-07-24 LAB
ALBUMIN SERPL-MCNC: 3.4 G/DL (ref 3.5–4.8)
ALBUMIN/GLOB SERPL: 0.9 {RATIO} (ref 1–2)
ALP LIVER SERPL-CCNC: 72 U/L (ref 37–98)
ALT SERPL-CCNC: 20 U/L (ref 14–54)
ANION GAP SERPL CALC-SCNC: 9 MMOL/L (ref 0–18)
AST SERPL-CCNC: 18 U/L (ref 15–41)
BASOPHILS # BLD AUTO: 0.05 X10(3) UL (ref 0–0.1)
BASOPHILS NFR BLD AUTO: 0.6 %
BILIRUB SERPL-MCNC: 0.5 MG/DL (ref 0.1–2)
BILIRUB UR QL STRIP.AUTO: NEGATIVE
BUN BLD-MCNC: 13 MG/DL (ref 8–20)
BUN/CREAT SERPL: 11 (ref 10–20)
CALCIUM BLD-MCNC: 8.8 MG/DL (ref 8.3–10.3)
CHLORIDE SERPL-SCNC: 107 MMOL/L (ref 101–111)
CLARITY UR REFRACT.AUTO: CLEAR
CO2 SERPL-SCNC: 25 MMOL/L (ref 22–32)
COLOR UR AUTO: YELLOW
CREAT BLD-MCNC: 1.18 MG/DL (ref 0.55–1.02)
EOSINOPHIL # BLD AUTO: 0.23 X10(3) UL (ref 0–0.3)
EOSINOPHIL NFR BLD AUTO: 2.8 %
ERYTHROCYTE [DISTWIDTH] IN BLOOD BY AUTOMATED COUNT: 12 % (ref 11.5–16)
GLOBULIN PLAS-MCNC: 4 G/DL (ref 2.5–3.7)
GLUCOSE BLD-MCNC: 96 MG/DL (ref 70–99)
GLUCOSE UR STRIP.AUTO-MCNC: NEGATIVE MG/DL
HCT VFR BLD AUTO: 37.1 % (ref 34–50)
HGB BLD-MCNC: 12.8 G/DL (ref 12–16)
IMMATURE GRANULOCYTE COUNT: 0.02 X10(3) UL (ref 0–1)
IMMATURE GRANULOCYTE RATIO %: 0.2 %
KETONES UR STRIP.AUTO-MCNC: NEGATIVE MG/DL
LYMPHOCYTES # BLD AUTO: 1.65 X10(3) UL (ref 0.9–4)
LYMPHOCYTES NFR BLD AUTO: 19.9 %
M PROTEIN MFR SERPL ELPH: 7.4 G/DL (ref 6.1–8.3)
MCH RBC QN AUTO: 31.4 PG (ref 27–33.2)
MCHC RBC AUTO-ENTMCNC: 34.5 G/DL (ref 31–37)
MCV RBC AUTO: 91.2 FL (ref 81–100)
MONOCYTES # BLD AUTO: 0.84 X10(3) UL (ref 0.1–1)
MONOCYTES NFR BLD AUTO: 10.1 %
NEUTROPHIL ABS PRELIM: 5.51 X10 (3) UL (ref 1.3–6.7)
NEUTROPHILS # BLD AUTO: 5.51 X10(3) UL (ref 1.3–6.7)
NEUTROPHILS NFR BLD AUTO: 66.4 %
NITRITE UR QL STRIP.AUTO: NEGATIVE
OSMOLALITY SERPL CALC.SUM OF ELEC: 292 MOSM/KG (ref 275–295)
PH UR STRIP.AUTO: 5 [PH] (ref 4.5–8)
PLATELET # BLD AUTO: 276 10(3)UL (ref 150–450)
POCT LOT NUMBER: NORMAL
POCT URINE PREGNANCY: NEGATIVE
POTASSIUM SERPL-SCNC: 4.5 MMOL/L (ref 3.6–5.1)
PROCEDURE CONTROL: NORMAL
PROT UR STRIP.AUTO-MCNC: NEGATIVE MG/DL
RBC # BLD AUTO: 4.07 X10(6)UL (ref 3.8–5.1)
RED CELL DISTRIBUTION WIDTH-SD: 40.4 FL (ref 35.1–46.3)
SODIUM SERPL-SCNC: 141 MMOL/L (ref 136–144)
SP GR UR STRIP.AUTO: 1.02 (ref 1–1.03)
UROBILINOGEN UR STRIP.AUTO-MCNC: 2 MG/DL
WBC # BLD AUTO: 8.3 X10(3) UL (ref 4–13)

## 2018-07-24 PROCEDURE — 0T778DZ DILATION OF LEFT URETER WITH INTRALUMINAL DEVICE, VIA NATURAL OR ARTIFICIAL OPENING ENDOSCOPIC: ICD-10-PCS | Performed by: UROLOGY

## 2018-07-24 PROCEDURE — 74420 UROGRAPHY RTRGR +-KUB: CPT | Performed by: UROLOGY

## 2018-07-24 PROCEDURE — 74018 RADEX ABDOMEN 1 VIEW: CPT | Performed by: EMERGENCY MEDICINE

## 2018-07-24 PROCEDURE — 99220 INITIAL OBSERVATION CARE,LEVL III: CPT | Performed by: INTERNAL MEDICINE

## 2018-07-24 DEVICE — INLAY OPTIMA STENT 4.7F 24CM: Type: IMPLANTABLE DEVICE | Site: URETER | Status: FUNCTIONAL

## 2018-07-24 RX ORDER — METOCLOPRAMIDE HYDROCHLORIDE 5 MG/ML
10 INJECTION INTRAMUSCULAR; INTRAVENOUS AS NEEDED
Status: DISCONTINUED | OUTPATIENT
Start: 2018-07-24 | End: 2018-07-24 | Stop reason: HOSPADM

## 2018-07-24 RX ORDER — SODIUM CHLORIDE, SODIUM LACTATE, POTASSIUM CHLORIDE, CALCIUM CHLORIDE 600; 310; 30; 20 MG/100ML; MG/100ML; MG/100ML; MG/100ML
INJECTION, SOLUTION INTRAVENOUS CONTINUOUS
Status: DISCONTINUED | OUTPATIENT
Start: 2018-07-24 | End: 2018-07-24 | Stop reason: HOSPADM

## 2018-07-24 RX ORDER — ONDANSETRON 2 MG/ML
4 INJECTION INTRAMUSCULAR; INTRAVENOUS EVERY 6 HOURS PRN
Status: DISCONTINUED | OUTPATIENT
Start: 2018-07-24 | End: 2018-07-25

## 2018-07-24 RX ORDER — METOCLOPRAMIDE HYDROCHLORIDE 5 MG/ML
10 INJECTION INTRAMUSCULAR; INTRAVENOUS EVERY 8 HOURS PRN
Status: DISCONTINUED | OUTPATIENT
Start: 2018-07-24 | End: 2018-07-25

## 2018-07-24 RX ORDER — LIDOCAINE HYDROCHLORIDE 20 MG/ML
JELLY TOPICAL AS NEEDED
Status: DISCONTINUED | OUTPATIENT
Start: 2018-07-24 | End: 2018-07-24 | Stop reason: HOSPADM

## 2018-07-24 RX ORDER — CEFAZOLIN SODIUM 1 G/3ML
INJECTION, POWDER, FOR SOLUTION INTRAMUSCULAR; INTRAVENOUS
Status: DISCONTINUED | OUTPATIENT
Start: 2018-07-24 | End: 2018-07-24 | Stop reason: HOSPADM

## 2018-07-24 RX ORDER — ACETAMINOPHEN 325 MG/1
650 TABLET ORAL EVERY 6 HOURS PRN
Status: DISCONTINUED | OUTPATIENT
Start: 2018-07-24 | End: 2018-07-25

## 2018-07-24 RX ORDER — HYDROCODONE BITARTRATE AND ACETAMINOPHEN 5; 325 MG/1; MG/1
1 TABLET ORAL AS NEEDED
Status: DISCONTINUED | OUTPATIENT
Start: 2018-07-24 | End: 2018-07-24 | Stop reason: HOSPADM

## 2018-07-24 RX ORDER — ALFUZOSIN HYDROCHLORIDE 10 MG/1
10 TABLET, EXTENDED RELEASE ORAL
Status: DISCONTINUED | OUTPATIENT
Start: 2018-07-25 | End: 2018-07-25

## 2018-07-24 RX ORDER — MEPERIDINE HYDROCHLORIDE 25 MG/ML
12.5 INJECTION INTRAMUSCULAR; INTRAVENOUS; SUBCUTANEOUS AS NEEDED
Status: DISCONTINUED | OUTPATIENT
Start: 2018-07-24 | End: 2018-07-24 | Stop reason: HOSPADM

## 2018-07-24 RX ORDER — LEVONORGESTREL AND ETHINYL ESTRADIOL 0.1-0.02MG
1 KIT ORAL DAILY
COMMUNITY
End: 2019-06-12

## 2018-07-24 RX ORDER — KETOROLAC TROMETHAMINE 15 MG/ML
15 INJECTION, SOLUTION INTRAMUSCULAR; INTRAVENOUS EVERY 6 HOURS PRN
Status: DISCONTINUED | OUTPATIENT
Start: 2018-07-24 | End: 2018-07-25

## 2018-07-24 RX ORDER — MORPHINE SULFATE 4 MG/ML
2 INJECTION, SOLUTION INTRAMUSCULAR; INTRAVENOUS EVERY 5 MIN PRN
Status: DISCONTINUED | OUTPATIENT
Start: 2018-07-24 | End: 2018-07-24 | Stop reason: HOSPADM

## 2018-07-24 RX ORDER — DIPHENHYDRAMINE HYDROCHLORIDE 50 MG/ML
12.5 INJECTION INTRAMUSCULAR; INTRAVENOUS AS NEEDED
Status: DISCONTINUED | OUTPATIENT
Start: 2018-07-24 | End: 2018-07-24 | Stop reason: HOSPADM

## 2018-07-24 RX ORDER — KETOROLAC TROMETHAMINE 30 MG/ML
30 INJECTION, SOLUTION INTRAMUSCULAR; INTRAVENOUS ONCE
Status: COMPLETED | OUTPATIENT
Start: 2018-07-24 | End: 2018-07-24

## 2018-07-24 RX ORDER — PHENAZOPYRIDINE HYDROCHLORIDE 200 MG/1
200 TABLET, FILM COATED ORAL 3 TIMES DAILY PRN
Status: DISCONTINUED | OUTPATIENT
Start: 2018-07-24 | End: 2018-07-25

## 2018-07-24 RX ORDER — SODIUM CHLORIDE 9 MG/ML
INJECTION, SOLUTION INTRAVENOUS CONTINUOUS
Status: DISCONTINUED | OUTPATIENT
Start: 2018-07-24 | End: 2018-07-25

## 2018-07-24 RX ORDER — MORPHINE SULFATE 4 MG/ML
4 INJECTION, SOLUTION INTRAMUSCULAR; INTRAVENOUS EVERY 30 MIN PRN
Status: DISCONTINUED | OUTPATIENT
Start: 2018-07-24 | End: 2018-07-25

## 2018-07-24 RX ORDER — ONDANSETRON 2 MG/ML
4 INJECTION INTRAMUSCULAR; INTRAVENOUS ONCE
Status: COMPLETED | OUTPATIENT
Start: 2018-07-24 | End: 2018-07-24

## 2018-07-24 RX ORDER — SODIUM CHLORIDE 9 MG/ML
INJECTION, SOLUTION INTRAVENOUS CONTINUOUS
Status: ACTIVE | OUTPATIENT
Start: 2018-07-24 | End: 2018-07-24

## 2018-07-24 RX ORDER — ONDANSETRON 2 MG/ML
4 INJECTION INTRAMUSCULAR; INTRAVENOUS AS NEEDED
Status: DISCONTINUED | OUTPATIENT
Start: 2018-07-24 | End: 2018-07-24 | Stop reason: HOSPADM

## 2018-07-24 RX ORDER — HYDROCODONE BITARTRATE AND ACETAMINOPHEN 5; 325 MG/1; MG/1
2 TABLET ORAL AS NEEDED
Status: DISCONTINUED | OUTPATIENT
Start: 2018-07-24 | End: 2018-07-24 | Stop reason: HOSPADM

## 2018-07-24 RX ORDER — NALOXONE HYDROCHLORIDE 0.4 MG/ML
80 INJECTION, SOLUTION INTRAMUSCULAR; INTRAVENOUS; SUBCUTANEOUS AS NEEDED
Status: DISCONTINUED | OUTPATIENT
Start: 2018-07-24 | End: 2018-07-24 | Stop reason: HOSPADM

## 2018-07-24 RX ORDER — MIDAZOLAM HYDROCHLORIDE 1 MG/ML
1 INJECTION INTRAMUSCULAR; INTRAVENOUS EVERY 5 MIN PRN
Status: DISCONTINUED | OUTPATIENT
Start: 2018-07-24 | End: 2018-07-24 | Stop reason: HOSPADM

## 2018-07-24 NOTE — PLAN OF CARE
NURSING ADMISSION NOTE      Patient admitted via stretcher. Oriented to room. Safety precautions initiated. Bed in low position. Call light in reach. IVF initiated, zofran given for nausea, adm navigator completed.

## 2018-07-24 NOTE — ED PROVIDER NOTES
Patient Seen in: [facility] Emergency Department    History   Patient presents with:  Abdomen/Flank Pain (GI/)    Stated Complaint: flank pain    HPI    43-year-old female comes in the hospital complaint of having difficulty with pain by their of signs reviewed. All other systems reviewed and negative except as noted above.     Physical Exam   ED Triage Vitals [07/24/18 0932]  BP: 120/70  Pulse: 77  Resp: 19  Temp: 99.2 °F (37.3 °C)  Temp src: Temporal  SpO2: 99 %  O2 Device: None (Room air) individual orders. POCT PREGNANCY, URINE   URINE CULTURE, ROUTINE   CBC W/ DIFFERENTIAL     Xr Abdomen (1 View) (cpt=74018)    Result Date: 7/24/2018  PROCEDURE:  XR ABDOMEN (1 VIEW) (CPT=74018)  INDICATIONS:  flank pain  COMPARISON:  None.   TECHNIQUE: given above although she has had improvement. Patient's ureteral stone is still present on imaging and after discussing the case the patient's urologist patient be admitted for further management at this time.   ED Course as of Jul 24 1101  ---------------

## 2018-07-24 NOTE — ED INITIAL ASSESSMENT (HPI)
Pt here for abdominal pain that radiates to the left  flank area. Pt notes that she was seen last Friday and they saw a stone and noted that she would follow up this week.

## 2018-07-24 NOTE — ANESTHESIA PREPROCEDURE EVALUATION
PRE-OP EVALUATION    Patient Name: Jose J Hawthorne    Pre-op Diagnosis: Calculi, ureter [N20.1]    Procedure(s):  CYSTOSCOPY, LEFT RETROGRADE PYELOGRAM, LEFT STENT PLACEMENT, POSSIBLE URETEROSCOPY, POSSIBLE LASER LITHOTRIPSY, [address] >4                                           Endo/Other                                  Pulmonary  Comment: Former smoker                         Neuro/Psych        (+) anxiety                            Past Surgical History:  5/22/17: Renee Tabor

## 2018-07-24 NOTE — H&P
[facility] HOSPITALIST  History and Physical     Jose J Hawthorne Patient Status:  Emergency    3/23/1975 MRN [id number]   Location [facility] Attending Cristobal Cochran MD   Hosp Day # 0 PCP Krista Lal MD     Chief Complaint [OTHER] Paternal Grandfather        Allergies:   Sporanox Jana Both*    RASH    Comment:CAPS    Medications:      No current facility-administered medications on file prior to encounter.    Current Outpatient Prescriptions on File Prior to Encounter:  HYDR BUN  16  13   CREATSERUM  1.17*  1.18*   GFRAA  66  66   GFRNAA  58*  57*   CA  8.3  8.8   ALB   --   3.4*   NA  137  141   K  4.0  4.5   CL  107  107   CO2  24.0  25.0   ALKPHO   --   72   AST   --   18   ALT   --   20   BILT   --   0.5   TP   --   7.4

## 2018-07-24 NOTE — CONSULTS
[facility]    Report of Consultation    Darek Greenison Patient Status:  Observation    3/23/1975 MRN [id number]   [facility] 0SW-A Attending Yaritza Oliva MD   Hosp Day # 0 PCP Suly Fontaine MD     Date of Admission:  2018 Cancer Paternal Grandfather      unk   • leukemia [OTHER] Paternal Grandfather       reports that she has quit smoking. She has never used smokeless tobacco. She reports that she drinks about 1.2 - 1.8 oz of alcohol per week .  She reports that she does not kidney stone last Friday. She still has left sided flank pain. FINDINGS:    BOWEL GAS PATTERN:  Normal.  No abnormal dilation or deviation.     CALCIFICATIONS:  There is a 8 x 5 mm calcification superior and lateral to the left L3 transverse proces right-sided calculi are found. The ureters are  grossly normal in course and caliber without associated calculi and no abnormal calcifications are  seen associated with the urinary bladder.  The renal cortices demonstrate no definitive solid  abnormalities ureteral injury or stricture discussed with patient. Aware if evidence of infection, significant edema surrounding stone or stricture ureteral stent will be placed with need for subsequent procedure to treat stone.     Recommendations:  Remain NPO  Obtain

## 2018-07-25 NOTE — ANESTHESIA POSTPROCEDURE EVALUATION
[facility] Nolene Arabia Patient Status:  Observation   Age/Gender 44year old female MRN [id number]   [facility] SURGERY Attending Nikole Grimaldo MD   Hosp Day # 0 PCP Philomena Radford MD       Anesthesia Post-op Note    Procedure(s):

## 2018-07-25 NOTE — PROGRESS NOTES
NURSING DISCHARGE NOTE    Discharged Home via Ambulatory. Accompanied by Family member  Belongings Taken by patient/family. Pt returned from PACU. A&Ox4. Tolerating water & crackers. Denies nausea. Pain rated 3/10, medication given.  Discharge instr

## 2018-07-25 NOTE — BRIEF OP NOTE
Pre-Operative Diagnosis: Calculi, ureter [N20.1]     Post-Operative Diagnosis: Calculi, ureter [N20.1]      Procedure Performed:   Procedure(s):  CYSTOSCOPY, LEFT RETROGRADE PYELOGRAM, LEFT URETERAL STENT PLACEMENT, LEFT URETEROSCOPY, STONE MANIPULATION

## 2018-07-25 NOTE — PROGRESS NOTES
TRISHA  Preop    Patient seen in preop area with her  at the bedside. Imaging reviewed. Discussed plan for tonight for cystoscopy, left retrograde pyelogram, left ureteroscopy, possible laser lithotripsy, left ureteral stent placement.     I discuss

## 2018-07-25 NOTE — PLAN OF CARE
GENITOURINARY - ADULT    • Absence of urinary retention Progressing        TO OR PER BED ACCOMPANIED BY . ALL BELONGINGS TAKEN BY .

## 2018-07-25 NOTE — OPERATIVE REPORT
[facility]    PATIENT'S NAME: Joseph Estevesregina   ATTENDING PHYSICIAN: Doris Coleman M.D.    OPERATING PHYSICIAN: Bernardo Ta DO   PATIENT ACCOUNT#:   [de-identified]    LOCATION:  [address] [place] [address] #:   [id number]       DATE OF BIRTH: patient's legs were raised to a dorsal lithotomy position. The patient's groin was prepped and draped in the usual sterile fashion. A well-lubricated 21-Belizean rigid cystoscope was then introduced through a normal female urethra and into the bladder.   Up Initially, a 6-Saudi Arabian x 24 cm stent was attempted to be placed but there was severe resistance in the distal ureter.   The ureteroscope was again replaced into the distal ureter to ensure that the safety wire was within the lumen of the ureter, which it was

## 2018-08-06 PROCEDURE — 87086 URINE CULTURE/COLONY COUNT: CPT | Performed by: OBSTETRICS & GYNECOLOGY

## 2018-08-10 ENCOUNTER — TELEPHONE (OUTPATIENT)
Dept: INTERNAL MEDICINE CLINIC | Facility: CLINIC | Age: 43
End: 2018-08-10

## 2018-08-21 PROCEDURE — 82365 CALCULUS SPECTROSCOPY: CPT | Performed by: UROLOGY

## 2018-10-15 ENCOUNTER — OFFICE VISIT (OUTPATIENT)
Dept: INTERNAL MEDICINE CLINIC | Facility: CLINIC | Age: 43
End: 2018-10-15
Payer: COMMERCIAL

## 2018-10-15 VITALS
SYSTOLIC BLOOD PRESSURE: 104 MMHG | WEIGHT: 175 LBS | DIASTOLIC BLOOD PRESSURE: 64 MMHG | HEART RATE: 84 BPM | HEIGHT: 67 IN | BODY MASS INDEX: 27.47 KG/M2 | TEMPERATURE: 98 F

## 2018-10-15 DIAGNOSIS — Z23 NEEDS FLU SHOT: Primary | ICD-10-CM

## 2018-10-15 DIAGNOSIS — Z00.00 ROUTINE GENERAL MEDICAL EXAMINATION AT A HEALTH CARE FACILITY: ICD-10-CM

## 2018-10-15 DIAGNOSIS — Z12.31 ENCOUNTER FOR SCREENING MAMMOGRAM FOR MALIGNANT NEOPLASM OF BREAST: ICD-10-CM

## 2018-10-15 PROBLEM — L08.9 FINGER INFECTION: Status: RESOLVED | Noted: 2017-12-11 | Resolved: 2018-10-15

## 2018-10-15 PROCEDURE — 99396 PREV VISIT EST AGE 40-64: CPT | Performed by: NURSE PRACTITIONER

## 2018-10-15 PROCEDURE — 90471 IMMUNIZATION ADMIN: CPT | Performed by: NURSE PRACTITIONER

## 2018-10-15 PROCEDURE — 90686 IIV4 VACC NO PRSV 0.5 ML IM: CPT | Performed by: NURSE PRACTITIONER

## 2018-10-15 NOTE — PROGRESS NOTES
Silvestre To is a 44year old female who presents for a complete physical exam:       Patient complains of:  History of Nephrolithiasis; Pushing water. Had issues with this over the summer. Doing better now.            Wt Readings from Last 6 Encount stone was already passed, no stent placement -    • CYSTOSCOPY,INSERT URETERAL STENT Left 07/2018    left ureter stent placement for large proximal stone, Dr. Paramjit Rosales   • CYSTOSCOPY/URETEROSCOPY/STONE EXTRACTION N/A 5/22/2017    Performed by 11/18/2015      TDAP                  07/06/2016    Dental Visits: yes   Colonoscopy: na   Pap: gyne  Dr Kemi Bashir.          History of dysplasia:    Menstrual Cycle: cycle regular   light         LMP: Sept 20  Symptoms:  Sexually Active:     Mammogram: Due Omid Patelr Hx of Nephrolithiasis.   Push fluids      Needs flu shot  (primary encounter diagnosis)    Orders Placed This Encounter      Flulaval 6 months and older 0.5 ml Quad PF [[id number]]      Meds & Refills for this Visit:  Requested Prescriptions      No prescripti

## 2018-12-14 ENCOUNTER — HOSPITAL ENCOUNTER (OUTPATIENT)
Dept: MAMMOGRAPHY | Facility: HOSPITAL | Age: 43
Discharge: HOME | End: 2018-12-14
Attending: NURSE PRACTITIONER
Payer: COMMERCIAL

## 2018-12-14 DIAGNOSIS — Z12.31 ENCOUNTER FOR SCREENING MAMMOGRAM FOR MALIGNANT NEOPLASM OF BREAST: ICD-10-CM

## 2018-12-14 PROCEDURE — 77063 BREAST TOMOSYNTHESIS BI: CPT | Performed by: NURSE PRACTITIONER

## 2018-12-14 PROCEDURE — 77067 SCR MAMMO BI INCL CAD: CPT | Performed by: NURSE PRACTITIONER

## 2019-04-10 PROCEDURE — 81015 MICROSCOPIC EXAM OF URINE: CPT | Performed by: PHYSICIAN ASSISTANT

## 2019-06-26 LAB
AMB EXT CHOL/HDL RATIO: 2.4
AMB EXT CHOLESTEROL, TOTAL: 176 MG/DL
AMB EXT CREATININE: 0.8 MG/DL
AMB EXT GLUCOSE: 86 MG/DL
AMB EXT HDL CHOLESTEROL: 73 MG/DL
AMB EXT HEMATOCRIT: 40.9
AMB EXT HEMOGLOBIN: 14.2
AMB EXT HGBA1C: 4.5 %
AMB EXT LDL CHOLESTEROL, DIRECT: 88 MG/DL
AMB EXT MCV: 90
AMB EXT PLATELETS: 302
AMB EXT TRIGLYCERIDES: 75 MG/DL
AMB EXT TSH: 1.15 MIU/ML
AMB EXT VLDL: 15 MG/DL
AMB EXT WBC: 4.6 X10(3)UL

## 2019-07-09 ENCOUNTER — MED REC SCAN ONLY (OUTPATIENT)
Dept: INTERNAL MEDICINE CLINIC | Facility: CLINIC | Age: 44
End: 2019-07-09

## 2019-07-09 ENCOUNTER — TELEPHONE (OUTPATIENT)
Dept: INTERNAL MEDICINE CLINIC | Facility: CLINIC | Age: 44
End: 2019-07-09

## 2019-07-09 NOTE — TELEPHONE ENCOUNTER
Spoke with patient informed we have not received lab results yet. Patient will have them refax lab results to our office. Hold for results.

## 2019-07-09 NOTE — TELEPHONE ENCOUNTER
Pt called stating [facility] was to fax us her recent lab results she had done thru her husbands work-pt wants to know if we received it? pt states her Iron level was elevated-does she need appt to come in?  Call to advise

## 2019-07-31 PROBLEM — R19.7 DIARRHEA OF PRESUMED INFECTIOUS ORIGIN: Status: ACTIVE | Noted: 2019-07-31

## 2019-07-31 PROBLEM — K29.30 CHRONIC SUPERFICIAL GASTRITIS: Status: ACTIVE | Noted: 2019-07-31

## 2019-07-31 PROBLEM — K63.5 POLYP, SIGMOID COLON: Status: ACTIVE | Noted: 2019-07-31

## 2019-07-31 PROBLEM — R13.19 OTHER DYSPHAGIA: Status: ACTIVE | Noted: 2019-07-31

## 2019-07-31 PROBLEM — K21.9 GASTROESOPHAGEAL REFLUX DISEASE WITHOUT ESOPHAGITIS: Status: ACTIVE | Noted: 2019-07-31

## 2019-07-31 PROBLEM — R12 HEARTBURN: Status: ACTIVE | Noted: 2019-07-31

## 2019-07-31 PROBLEM — R10.12 ABDOMINAL PAIN, LEFT UPPER QUADRANT: Status: ACTIVE | Noted: 2019-07-31

## 2020-05-20 ENCOUNTER — VIRTUAL PHONE E/M (OUTPATIENT)
Dept: INTERNAL MEDICINE CLINIC | Facility: CLINIC | Age: 45
End: 2020-05-20
Payer: COMMERCIAL

## 2020-05-20 DIAGNOSIS — F41.9 ANXIETY: ICD-10-CM

## 2020-05-20 DIAGNOSIS — J30.2 SEASONAL ALLERGIES: ICD-10-CM

## 2020-05-20 DIAGNOSIS — R51.9 FRONTAL HEADACHE: Primary | ICD-10-CM

## 2020-05-20 PROBLEM — N20.0 KIDNEY STONE ON LEFT SIDE: Status: RESOLVED | Noted: 2018-07-24 | Resolved: 2020-05-20

## 2020-05-20 PROBLEM — R10.12 ABDOMINAL PAIN, LEFT UPPER QUADRANT: Status: RESOLVED | Noted: 2019-07-31 | Resolved: 2020-05-20

## 2020-05-20 PROBLEM — R31.29 MICROSCOPIC HEMATURIA: Status: RESOLVED | Noted: 2017-05-09 | Resolved: 2020-05-20

## 2020-05-20 PROBLEM — R19.7 DIARRHEA OF PRESUMED INFECTIOUS ORIGIN: Status: RESOLVED | Noted: 2019-07-31 | Resolved: 2020-05-20

## 2020-05-20 PROBLEM — K29.30 CHRONIC SUPERFICIAL GASTRITIS: Status: RESOLVED | Noted: 2019-07-31 | Resolved: 2020-05-20

## 2020-05-20 PROBLEM — K63.5 POLYP, SIGMOID COLON: Status: RESOLVED | Noted: 2019-07-31 | Resolved: 2020-05-20

## 2020-05-20 PROBLEM — R13.19 OTHER DYSPHAGIA: Status: RESOLVED | Noted: 2019-07-31 | Resolved: 2020-05-20

## 2020-05-20 PROBLEM — R12 HEARTBURN: Status: RESOLVED | Noted: 2019-07-31 | Resolved: 2020-05-20

## 2020-05-20 PROBLEM — Z87.442 HISTORY OF NEPHROLITHIASIS: Status: ACTIVE | Noted: 2017-05-16

## 2020-05-20 PROCEDURE — 99214 OFFICE O/P EST MOD 30 MIN: CPT | Performed by: NURSE PRACTITIONER

## 2020-05-20 RX ORDER — FLUTICASONE PROPIONATE 50 MCG
1 SPRAY, SUSPENSION (ML) NASAL 2 TIMES DAILY
Qty: 1 BOTTLE | Refills: 3 | Status: SHIPPED | OUTPATIENT
Start: 2020-05-20 | End: 2020-07-08

## 2020-05-20 NOTE — PROGRESS NOTES
Due to COVID-19 ACTION PLAN, the patient's office visit was converted to an audio visit. This visit is conducted using audio only. The patient consents to this service.   The patient understands and accepts financial responsibility for any deductible, co- denies chest pain on exertion, no palpatations  GI: denies abdominal pain and denies heartburn, no diarrhea or constipation  MUSCULOSKELETAL:  No arthralgias or myalgias  NEURO: headache    EXAM:   Limited exam as this is an audio visit.   Appropriate affec 911 for worsening symptoms or acute distress.

## 2020-06-11 ENCOUNTER — TELEPHONE (OUTPATIENT)
Dept: INTERNAL MEDICINE CLINIC | Facility: CLINIC | Age: 45
End: 2020-06-11

## 2020-06-11 DIAGNOSIS — Z00.00 ROUTINE GENERAL MEDICAL EXAMINATION AT A HEALTH CARE FACILITY: Primary | ICD-10-CM

## 2020-06-11 DIAGNOSIS — Z13.220 SCREENING FOR LIPID DISORDERS: ICD-10-CM

## 2020-06-11 DIAGNOSIS — Z13.0 SCREENING FOR DISORDER OF BLOOD AND BLOOD-FORMING ORGANS: ICD-10-CM

## 2020-06-11 DIAGNOSIS — Z13.29 SCREENING FOR THYROID DISORDER: ICD-10-CM

## 2020-06-11 DIAGNOSIS — Z13.228 SCREENING FOR METABOLIC DISORDER: ICD-10-CM

## 2020-06-11 NOTE — TELEPHONE ENCOUNTER
Future Appointments   Date Time Provider Nathanael Neal   6/29/2020  8:00 AM CELINA Macias [facility]     Patient is scheduled for Annual Physical.  Please place orders with Steffi Olson. Patient aware to fast.  No call back required.   Marilia

## 2020-06-19 NOTE — TELEPHONE ENCOUNTER
Pt is trying to schedule her physical labs. Unable to schedule until orders are placed. No call back necessary.

## 2020-06-24 ENCOUNTER — LAB ENCOUNTER (OUTPATIENT)
Dept: LAB | Age: 45
End: 2020-06-24
Attending: NURSE PRACTITIONER
Payer: COMMERCIAL

## 2020-06-24 DIAGNOSIS — Z13.29 SCREENING FOR THYROID DISORDER: ICD-10-CM

## 2020-06-24 DIAGNOSIS — Z13.220 SCREENING FOR LIPID DISORDERS: ICD-10-CM

## 2020-06-24 DIAGNOSIS — Z13.228 SCREENING FOR METABOLIC DISORDER: ICD-10-CM

## 2020-06-24 DIAGNOSIS — Z00.00 ROUTINE GENERAL MEDICAL EXAMINATION AT A HEALTH CARE FACILITY: ICD-10-CM

## 2020-06-24 DIAGNOSIS — Z13.0 SCREENING FOR DISORDER OF BLOOD AND BLOOD-FORMING ORGANS: ICD-10-CM

## 2020-06-24 PROCEDURE — 80061 LIPID PANEL: CPT | Performed by: NURSE PRACTITIONER

## 2020-06-24 PROCEDURE — 80050 GENERAL HEALTH PANEL: CPT | Performed by: NURSE PRACTITIONER

## 2020-07-08 ENCOUNTER — OFFICE VISIT (OUTPATIENT)
Dept: INTERNAL MEDICINE CLINIC | Facility: CLINIC | Age: 45
End: 2020-07-08
Payer: COMMERCIAL

## 2020-07-08 VITALS
BODY MASS INDEX: 27.6 KG/M2 | SYSTOLIC BLOOD PRESSURE: 104 MMHG | DIASTOLIC BLOOD PRESSURE: 72 MMHG | HEART RATE: 80 BPM | TEMPERATURE: 97 F | HEIGHT: 67 IN | WEIGHT: 175.81 LBS | RESPIRATION RATE: 16 BRPM

## 2020-07-08 DIAGNOSIS — F41.9 ANXIETY: ICD-10-CM

## 2020-07-08 DIAGNOSIS — R14.0 ABDOMINAL BLOATING: ICD-10-CM

## 2020-07-08 DIAGNOSIS — E53.8 VITAMIN B 12 DEFICIENCY: ICD-10-CM

## 2020-07-08 DIAGNOSIS — Z87.442 HISTORY OF NEPHROLITHIASIS: ICD-10-CM

## 2020-07-08 DIAGNOSIS — Z00.00 ROUTINE GENERAL MEDICAL EXAMINATION AT A HEALTH CARE FACILITY: Primary | ICD-10-CM

## 2020-07-08 PROCEDURE — 99396 PREV VISIT EST AGE 40-64: CPT | Performed by: NURSE PRACTITIONER

## 2020-07-08 RX ORDER — PANTOPRAZOLE SODIUM 40 MG/1
40 TABLET, DELAYED RELEASE ORAL
Qty: 90 TABLET | Refills: 1 | Status: SHIPPED | OUTPATIENT
Start: 2020-07-08 | End: 2020-09-03 | Stop reason: ALTCHOICE

## 2020-07-08 NOTE — PROGRESS NOTES
Vielka Guzman is a 41year old female who presents for a complete physical exam:       Patient complains of:    History b12 def  Taking MVI  Will check level after holding supplement for a few days    abd bloating. Persistent. EGD and colonoscopy done. • Frequency of urination    • Frequent urination    • Frequent use of laxatives    • Indigestion    • Kidney stone 2017   • Microhematuria     pseudo   • Normal delivery 8/07,5/09    at [facility]   • Personal history of urinary calculi     during pregnancy Paternal Grandmother    • Other (HTN) Maternal Grandfather    • Cancer Paternal Grandfather         unk   • Other (leukemia) Paternal Grandfather            Health Maintenance  Immunizations:     Immunization History  Administered            Date(s) Rhoda Cee distress  SKIN: no rashes,no suspicious lesions  HEENT: atraumatic, normocephalic,ears are clear  EYES:PERRLA, EOMI, conjunctiva are clear  NECK: supple,no adenopathy,  LUNGS: clear to auscultation  CARDIO: RRR without murmur  GI: good BS's,no masses, HSM

## 2020-07-24 ENCOUNTER — APPOINTMENT (OUTPATIENT)
Dept: LAB | Age: 45
End: 2020-07-24
Attending: NURSE PRACTITIONER
Payer: COMMERCIAL

## 2020-07-24 ENCOUNTER — HOSPITAL ENCOUNTER (OUTPATIENT)
Dept: ULTRASOUND IMAGING | Age: 45
Discharge: HOME | End: 2020-07-24
Attending: NURSE PRACTITIONER
Payer: COMMERCIAL

## 2020-07-24 DIAGNOSIS — R14.0 ABDOMINAL BLOATING: ICD-10-CM

## 2020-07-24 DIAGNOSIS — E53.8 VITAMIN B 12 DEFICIENCY: ICD-10-CM

## 2020-07-24 LAB — VIT B12 SERPL-MCNC: 363 PG/ML (ref 193–986)

## 2020-07-24 PROCEDURE — 82607 VITAMIN B-12: CPT | Performed by: NURSE PRACTITIONER

## 2020-07-24 PROCEDURE — 76700 US EXAM ABDOM COMPLETE: CPT | Performed by: NURSE PRACTITIONER

## 2020-07-24 PROCEDURE — 36415 COLL VENOUS BLD VENIPUNCTURE: CPT | Performed by: NURSE PRACTITIONER

## 2020-07-28 ENCOUNTER — TELEPHONE (OUTPATIENT)
Dept: INTERNAL MEDICINE CLINIC | Facility: CLINIC | Age: 45
End: 2020-07-28

## 2020-07-28 NOTE — TELEPHONE ENCOUNTER
Pt cancelled appt 7/29.   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

## 2020-07-29 ENCOUNTER — TELEPHONE (OUTPATIENT)
Dept: INTERNAL MEDICINE CLINIC | Facility: CLINIC | Age: 45
End: 2020-07-29

## 2020-07-29 NOTE — TELEPHONE ENCOUNTER
Patient is asking for covid testing to be completed due to patient planning on visiting parents who are high risk (cancer, DM). Patient has no symptoms and no known exposure to anyone suspected/positive of COVID.      Patient given alternative site location

## 2020-07-29 NOTE — TELEPHONE ENCOUNTER
Only availability at 4:00 today with SD; patient scheduled appointment to discuss anxiety medication and COVID testing.      ALTON GRAHAM.

## 2020-07-29 NOTE — PROGRESS NOTES
Due to COVID-19 ACTION PLAN, the patient's office visit was converted to an audio visit. This visit is conducted using audio only. The patient consents to this service.   The patient understands and accepts financial responsibility for any deductible, co- an audio visit. Appropriate affect, alert and oriented x 3. No distress. No conversational dyspnea. Speaking in full sentences. Sounds comfortable.       ASSESSMENT AND PLAN:     Screening for viral disease  (primary encounter diagnosis)  COVID testing

## 2020-07-29 NOTE — TELEPHONE ENCOUNTER
Pt calling to ask questions about getting covid testing-she has no symptoms and has not been around anyone positive for covid but is going to visit her elderly parents and has some concerns-call to advise

## 2020-09-03 ENCOUNTER — HOSPITAL ENCOUNTER (OUTPATIENT)
Dept: ULTRASOUND IMAGING | Facility: HOSPITAL | Age: 45
Discharge: HOME | End: 2020-09-03
Attending: PHYSICIAN ASSISTANT
Payer: COMMERCIAL

## 2020-09-03 ENCOUNTER — OFFICE VISIT (OUTPATIENT)
Dept: INTERNAL MEDICINE CLINIC | Facility: CLINIC | Age: 45
End: 2020-09-03
Payer: COMMERCIAL

## 2020-09-03 VITALS
HEART RATE: 90 BPM | SYSTOLIC BLOOD PRESSURE: 108 MMHG | BODY MASS INDEX: 27.47 KG/M2 | WEIGHT: 175 LBS | TEMPERATURE: 98 F | RESPIRATION RATE: 16 BRPM | DIASTOLIC BLOOD PRESSURE: 70 MMHG | HEIGHT: 67 IN

## 2020-09-03 DIAGNOSIS — M79.662 PAIN OF LEFT CALF: ICD-10-CM

## 2020-09-03 DIAGNOSIS — Z82.49 FAMILY HISTORY OF DVT: ICD-10-CM

## 2020-09-03 DIAGNOSIS — M79.662 PAIN OF LEFT CALF: Primary | ICD-10-CM

## 2020-09-03 PROCEDURE — 3008F BODY MASS INDEX DOCD: CPT | Performed by: PHYSICIAN ASSISTANT

## 2020-09-03 PROCEDURE — 3074F SYST BP LT 130 MM HG: CPT | Performed by: PHYSICIAN ASSISTANT

## 2020-09-03 PROCEDURE — 93971 EXTREMITY STUDY: CPT | Performed by: PHYSICIAN ASSISTANT

## 2020-09-03 PROCEDURE — 3078F DIAST BP <80 MM HG: CPT | Performed by: PHYSICIAN ASSISTANT

## 2020-09-03 PROCEDURE — 99214 OFFICE O/P EST MOD 30 MIN: CPT | Performed by: PHYSICIAN ASSISTANT

## 2020-09-03 NOTE — PROGRESS NOTES
Patient presents with:  Musculoskeletal Problem: DD Rm 10, Left leg pain x 4 days, Sitting Constant throbbing pain,       HPI:  Pt presents with c/o L calf pain X 4 days. When she sits it seems to be worse and describes it as a \"throbbing\" pain.   Pt den warmth  Skin: Skin is warm and dry. No rash noted. No erythema. No pallor. A/P:    Pain of left calf  (primary encounter diagnosis) - Check stat L LE doppler to r/o DVT. Pt has appt at 6:30.     Family history of dvt    No orders of the defined types

## 2020-09-03 NOTE — PROGRESS NOTES
Negative for DVT. I called and spoke with pt and gave her results personally. She will try Aleve, gentle stretching, activity modification. If not resolving she will notify me for further recs.   She will also monitor for new sxs and notify me if occurs,

## 2021-03-13 ENCOUNTER — HOSPITAL ENCOUNTER (OUTPATIENT)
Age: 46
Discharge: HOME | End: 2021-03-13
Payer: COMMERCIAL

## 2021-03-13 VITALS
OXYGEN SATURATION: 95 % | BODY MASS INDEX: 27 KG/M2 | HEART RATE: 95 BPM | RESPIRATION RATE: 18 BRPM | DIASTOLIC BLOOD PRESSURE: 79 MMHG | SYSTOLIC BLOOD PRESSURE: 117 MMHG | TEMPERATURE: 98 F | WEIGHT: 175.06 LBS

## 2021-03-13 DIAGNOSIS — J06.9 VIRAL URI: ICD-10-CM

## 2021-03-13 DIAGNOSIS — J02.9 ACUTE SORE THROAT: Primary | ICD-10-CM

## 2021-03-13 LAB
POCT RAPID STREP: NEGATIVE
SARS-COV-2 RNA RESP QL NAA+PROBE: NOT DETECTED

## 2021-03-13 PROCEDURE — 87081 CULTURE SCREEN ONLY: CPT | Performed by: PHYSICIAN ASSISTANT

## 2021-03-13 PROCEDURE — U0002 COVID-19 LAB TEST NON-CDC: HCPCS | Performed by: PHYSICIAN ASSISTANT

## 2021-03-13 PROCEDURE — 87880 STREP A ASSAY W/OPTIC: CPT | Performed by: PHYSICIAN ASSISTANT

## 2021-03-13 PROCEDURE — 99212 OFFICE O/P EST SF 10 MIN: CPT | Performed by: PHYSICIAN ASSISTANT

## 2021-03-13 NOTE — ED PROVIDER NOTES
Patient Seen in: Immediate [address]      History   Patient presents with:  Sore Throat  Headache    Stated Complaint: sore throat , cough , headaches , X Wed     HPI/Subjective:   HPI    CHIEF COMPLAINT: Sore throat, congestion for 4 days laxatives    • Indigestion    • Kidney stone 2017   • Microhematuria     pseudo   • Normal delivery 8/07,5/09    at [facility]   • Personal history of urinary calculi     during pregnancy   • Stress    • Unspecified sinusitis (chronic)    • Weight gain reviewed. All other systems reviewed and negative except as noted above.     Physical Exam     ED Triage Vitals [03/13/21 0837]   /79   Pulse 95   Resp 18   Temp 97.5 °F (36.4 °C)   Temp src Temporal   SpO2 95 %   O2 Device None (Room air) and Plan     Clinical Impression:  Acute sore throat  (primary encounter diagnosis)  Viral URI    Disposition:  Discharge  3/13/2021  9:35 am    Follow-up:  Yakelin Saldivar MD  [address] [place] [address]  [phone]    In 3 days

## 2021-03-13 NOTE — ED INITIAL ASSESSMENT (HPI)
Received second covid vaccine 2 weeks ago. Developed dry throat/irritation w sinus pressure HA. Minimal relief w dayquil & nyquil. Concerned about strep & possible Covid - daughter is compromised. Pt states she has been at home. No exposure.

## 2021-05-18 ENCOUNTER — HOSPITAL ENCOUNTER (OUTPATIENT)
Age: 46
Discharge: HOME | End: 2021-05-18
Payer: COMMERCIAL

## 2021-05-18 VITALS
SYSTOLIC BLOOD PRESSURE: 117 MMHG | RESPIRATION RATE: 18 BRPM | OXYGEN SATURATION: 97 % | TEMPERATURE: 99 F | DIASTOLIC BLOOD PRESSURE: 66 MMHG | HEART RATE: 79 BPM

## 2021-05-18 DIAGNOSIS — R31.9 URINARY TRACT INFECTION WITH HEMATURIA, SITE UNSPECIFIED: Primary | ICD-10-CM

## 2021-05-18 DIAGNOSIS — N39.0 URINARY TRACT INFECTION WITH HEMATURIA, SITE UNSPECIFIED: Primary | ICD-10-CM

## 2021-05-18 PROCEDURE — 87086 URINE CULTURE/COLONY COUNT: CPT | Performed by: NURSE PRACTITIONER

## 2021-05-18 PROCEDURE — 81025 URINE PREGNANCY TEST: CPT | Performed by: NURSE PRACTITIONER

## 2021-05-18 PROCEDURE — 87186 SC STD MICRODIL/AGAR DIL: CPT | Performed by: NURSE PRACTITIONER

## 2021-05-18 PROCEDURE — 81002 URINALYSIS NONAUTO W/O SCOPE: CPT | Performed by: NURSE PRACTITIONER

## 2021-05-18 PROCEDURE — 99213 OFFICE O/P EST LOW 20 MIN: CPT | Performed by: NURSE PRACTITIONER

## 2021-05-18 PROCEDURE — 87077 CULTURE AEROBIC IDENTIFY: CPT | Performed by: NURSE PRACTITIONER

## 2021-05-18 RX ORDER — FLUCONAZOLE 150 MG/1
150 TABLET ORAL ONCE
Qty: 2 TABLET | Refills: 0 | Status: SHIPPED | OUTPATIENT
Start: 2021-05-18 | End: 2021-05-18

## 2021-05-18 RX ORDER — NITROFURANTOIN 25; 75 MG/1; MG/1
100 CAPSULE ORAL 2 TIMES DAILY
Qty: 10 CAPSULE | Refills: 0 | Status: SHIPPED | OUTPATIENT
Start: 2021-05-18 | End: 2021-05-23

## 2021-05-18 NOTE — ED PROVIDER NOTES
Patient Seen in: Immediate [address] [place] [address]      History   Patient presents with:  Urinary Symptoms    Stated Complaint: urinary concern    HPI/Subjective: This is a 45-year-old female with below stated medical history.   Presents to immediate care Review of Systems   Constitutional: Negative for chills and fever. HENT: Negative for congestion and sore throat. Respiratory: Negative for cough and shortness of breath. Cardiovascular: Negative for chest pain.    Gastrointestinal: Negative f respiratory distress. Breath sounds: Normal breath sounds. No stridor. No wheezing, rhonchi or rales. Abdominal:      General: Bowel sounds are normal. There is no distension. Palpations: There is no mass. Tenderness:  There is no abdominal discussed. Follow-up with PMD in 1 week. Reasons to seek emergent treatment for worsening in condition also discussed. All results, plan of care, and treatment discussed personally with patient. She agreed with the plan. All questions answered.

## 2021-05-18 NOTE — ED INITIAL ASSESSMENT (HPI)
Pt c/o irritation and discomfort with urination for about one week. Pt denies any back pain for fever. Pt denies any vaginal discharge.

## 2021-06-16 ENCOUNTER — HOSPITAL ENCOUNTER (OUTPATIENT)
Age: 46
Discharge: HOME | End: 2021-06-16
Payer: COMMERCIAL

## 2021-06-16 VITALS
SYSTOLIC BLOOD PRESSURE: 111 MMHG | HEART RATE: 80 BPM | TEMPERATURE: 98 F | HEIGHT: 66 IN | DIASTOLIC BLOOD PRESSURE: 68 MMHG | WEIGHT: 173 LBS | BODY MASS INDEX: 27.8 KG/M2 | RESPIRATION RATE: 16 BRPM | OXYGEN SATURATION: 98 %

## 2021-06-16 DIAGNOSIS — Z20.822 ENCOUNTER FOR LABORATORY TESTING FOR COVID-19 VIRUS: ICD-10-CM

## 2021-06-16 DIAGNOSIS — J06.9 VIRAL URI: Primary | ICD-10-CM

## 2021-06-16 PROCEDURE — U0002 COVID-19 LAB TEST NON-CDC: HCPCS | Performed by: NURSE PRACTITIONER

## 2021-06-16 PROCEDURE — 99213 OFFICE O/P EST LOW 20 MIN: CPT | Performed by: NURSE PRACTITIONER

## 2021-06-16 PROCEDURE — 87880 STREP A ASSAY W/OPTIC: CPT | Performed by: NURSE PRACTITIONER

## 2021-06-16 NOTE — ED PROVIDER NOTES
Patient Seen in: Immediate [address] [place] [address]      History   Patient presents with:  Covid-19 Test    Stated Complaint: Covid-19 Test    HPI/Subjective: This is a 81-year-old female with below stated medical history.   Presents to immediate care for 2 - 3 Standard drinks or equivalent per week      Comment: cage done 7/8/20    Drug use: No             Review of Systems   Constitutional: Negative for chills and fever. HENT: Positive for congestion, postnasal drip, rhinorrhea and sore throat.  Negative membranes are moist.      Pharynx: Oropharynx is clear. Uvula midline. Posterior oropharyngeal erythema present. No pharyngeal swelling, oropharyngeal exudate or uvula swelling. Tonsils: No tonsillar exudate or tonsillar abscesses.    Eyes:      Reza Rodríguez for COVID-19 virus     Disposition:  Discharge  6/16/2021  8:58 am    Follow-up:  Sesar Bhardwaj MD  [address] [place] [address]  [phone]    In 1 week            Medications Prescribed:  Current Discharge Medication List

## 2021-06-16 NOTE — ED INITIAL ASSESSMENT (HPI)
Head congested, post nasal drip Sore throat x 24 hours. .  Has been vaccinated. Going to visit family.

## 2021-07-15 LAB
AMB EXT CHOL/HDL RATIO: 2.3
AMB EXT CHOLESTEROL, TOTAL: 196 MG/DL
AMB EXT CMP ALT: 11 U/L
AMB EXT CMP AST: 13 U/L
AMB EXT CREATININE: 0.85 MG/DL
AMB EXT GLUCOSE: 84 MG/DL
AMB EXT HDL CHOLESTEROL: 84 MG/DL
AMB EXT HGBA1C: 4.9 %
AMB EXT LDL CHOLESTEROL, DIRECT: 100 MG/DL
AMB EXT TRIGLYCERIDES: 66 MG/DL
AMB EXT VLDL: 12 MG/DL

## 2021-07-30 ENCOUNTER — TELEPHONE (OUTPATIENT)
Dept: INTERNAL MEDICINE CLINIC | Facility: CLINIC | Age: 46
End: 2021-07-30

## 2021-07-30 NOTE — TELEPHONE ENCOUNTER
Pt stated she had labs done already and will fax them over to SD. Pt wants us to look at them and let her know if there are any labs that are needed. Please advise. [facility] to [facility]. Pt aware to get labs done no sooner than 2 weeks prior to the appt.

## 2021-08-05 ENCOUNTER — TELEPHONE (OUTPATIENT)
Dept: INTERNAL MEDICINE CLINIC | Facility: CLINIC | Age: 46
End: 2021-08-05

## 2021-08-05 NOTE — TELEPHONE ENCOUNTER
[facility] 7[facility]15/21 placed on provider CB desk for review.

## 2021-09-30 ENCOUNTER — OFFICE VISIT (OUTPATIENT)
Dept: INTERNAL MEDICINE CLINIC | Facility: CLINIC | Age: 46
End: 2021-09-30
Payer: COMMERCIAL

## 2021-09-30 VITALS
HEIGHT: 66 IN | WEIGHT: 179 LBS | DIASTOLIC BLOOD PRESSURE: 64 MMHG | HEART RATE: 78 BPM | RESPIRATION RATE: 16 BRPM | BODY MASS INDEX: 28.77 KG/M2 | SYSTOLIC BLOOD PRESSURE: 104 MMHG | TEMPERATURE: 99 F

## 2021-09-30 DIAGNOSIS — Z13.0 SCREENING FOR DISORDER OF BLOOD AND BLOOD-FORMING ORGANS: ICD-10-CM

## 2021-09-30 DIAGNOSIS — E53.8 VITAMIN B 12 DEFICIENCY: ICD-10-CM

## 2021-09-30 DIAGNOSIS — Z13.29 SCREENING FOR THYROID DISORDER: ICD-10-CM

## 2021-09-30 DIAGNOSIS — Z00.00 ANNUAL PHYSICAL EXAM: Primary | ICD-10-CM

## 2021-09-30 DIAGNOSIS — E55.9 VITAMIN D DEFICIENCY: ICD-10-CM

## 2021-09-30 PROCEDURE — 3074F SYST BP LT 130 MM HG: CPT | Performed by: PHYSICIAN ASSISTANT

## 2021-09-30 PROCEDURE — 3078F DIAST BP <80 MM HG: CPT | Performed by: PHYSICIAN ASSISTANT

## 2021-09-30 PROCEDURE — 99396 PREV VISIT EST AGE 40-64: CPT | Performed by: PHYSICIAN ASSISTANT

## 2021-09-30 PROCEDURE — 3008F BODY MASS INDEX DOCD: CPT | Performed by: PHYSICIAN ASSISTANT

## 2021-09-30 NOTE — PROGRESS NOTES
Patient presents with:  Physical: DD Rm 6, Annual Physical       HPI:  Pt presents for annual physical.  Pt admits that she has trouble with early AM awakening at times. Usually about 1 hour prior to time to get up.   Does not wake up feeling anxious but a unspecified    • Dizziness    • Flatulence/gas pain/belching    • Frequency of urination    • Frequent urination    • Frequent use of laxatives    • Indigestion    • Kidney stone 2017   • Microhematuria     pseudo   • Normal delivery 8/07,5/09    at [facility] Allergies    Sporanox Delfino Anes*    RASH    Comment:CAPS    Health Maintenance  Immunizations:  Already had flu shot.     Immunization History  Administered            Date(s) Administered    Covid-19 Vaccine Moderna 100 mcg/0.5 ml no tenderness. No effusions. Lymphadenopathy: No cervical adenopathy. Neurological: Normal reflexes. No cranial nerve deficit or sensory deficit. Normal muscle tone. Coordination normal.   Skin: Skin is warm and dry. No rash noted. No erythema.  No pall

## 2021-10-11 ENCOUNTER — LAB ENCOUNTER (OUTPATIENT)
Dept: LAB | Age: 46
End: 2021-10-11
Attending: PHYSICIAN ASSISTANT
Payer: COMMERCIAL

## 2021-10-11 DIAGNOSIS — E53.8 VITAMIN B 12 DEFICIENCY: ICD-10-CM

## 2021-10-11 DIAGNOSIS — Z13.29 SCREENING FOR THYROID DISORDER: ICD-10-CM

## 2021-10-11 DIAGNOSIS — Z13.0 SCREENING FOR DISORDER OF BLOOD AND BLOOD-FORMING ORGANS: ICD-10-CM

## 2021-10-11 DIAGNOSIS — E55.9 VITAMIN D DEFICIENCY: ICD-10-CM

## 2021-10-11 PROCEDURE — 85025 COMPLETE CBC W/AUTO DIFF WBC: CPT | Performed by: PHYSICIAN ASSISTANT

## 2021-10-11 PROCEDURE — 84443 ASSAY THYROID STIM HORMONE: CPT | Performed by: PHYSICIAN ASSISTANT

## 2021-10-11 PROCEDURE — 82607 VITAMIN B-12: CPT | Performed by: PHYSICIAN ASSISTANT

## 2021-10-11 PROCEDURE — 82306 VITAMIN D 25 HYDROXY: CPT | Performed by: PHYSICIAN ASSISTANT

## 2021-10-20 ENCOUNTER — PATIENT MESSAGE (OUTPATIENT)
Dept: INTERNAL MEDICINE CLINIC | Facility: CLINIC | Age: 46
End: 2021-10-20

## 2021-10-21 NOTE — TELEPHONE ENCOUNTER
From: Isatu Walker  To: Sunday Quiros PA-C  Sent: 10/20/2021 9:17 PM CDT  Subject: Covid Booster    Hi Traci! I think the [facility] may approve the booster for [facility] tomorrow. Are you able to put in order for me to get that?  I would like to get it ASA

## 2021-10-21 NOTE — TELEPHONE ENCOUNTER
Doesn't need an order. Please let her know can look into at [facility] on MyChart or seek at outside pharmacy. It has been approved and she is eligible as it has been more than 6 mos since her last Moderna vaccine.

## 2021-11-08 ENCOUNTER — TELEPHONE (OUTPATIENT)
Dept: PEDIATRICS CLINIC | Facility: HOSPITAL | Age: 46
End: 2021-11-08

## 2021-11-20 ENCOUNTER — NURSE ONLY (OUTPATIENT)
Dept: LAB | Age: 46
End: 2021-11-20
Attending: PHYSICIAN ASSISTANT
Payer: COMMERCIAL

## 2021-11-20 DIAGNOSIS — Z11.52 ENCOUNTER FOR SCREENING FOR COVID-19: ICD-10-CM

## 2021-12-22 ENCOUNTER — NURSE ONLY (OUTPATIENT)
Dept: LAB | Age: 46
End: 2021-12-22
Attending: PHYSICIAN ASSISTANT
Payer: COMMERCIAL

## 2021-12-22 DIAGNOSIS — Z11.52 ENCOUNTER FOR SCREENING FOR COVID-19: ICD-10-CM

## 2021-12-31 ENCOUNTER — LAB ENCOUNTER (OUTPATIENT)
Dept: LAB | Facility: HOSPITAL | Age: 46
End: 2021-12-31
Attending: PHYSICIAN ASSISTANT
Payer: COMMERCIAL

## 2021-12-31 ENCOUNTER — PATIENT MESSAGE (OUTPATIENT)
Dept: INTERNAL MEDICINE CLINIC | Facility: CLINIC | Age: 46
End: 2021-12-31

## 2021-12-31 DIAGNOSIS — R53.83 FATIGUE, UNSPECIFIED TYPE: ICD-10-CM

## 2021-12-31 DIAGNOSIS — J02.9 SORE THROAT: ICD-10-CM

## 2021-12-31 DIAGNOSIS — J02.9 SORE THROAT: Primary | ICD-10-CM

## 2021-12-31 NOTE — TELEPHONE ENCOUNTER
From: Kacy Irving  To: Lynnette Mathews PA-C  Sent: 12/31/2021 8:33 AM CST  Subject: Covid Test PCR    Hi Traci! Are you able to order me a Covid PCR Test? Having some symptoms. Thanks!     Troy Tang no

## 2022-01-02 LAB — SARS-COV-2 RNA RESP QL NAA+PROBE: NOT DETECTED

## 2022-06-21 LAB
AMB EXT CHOLESTEROL, TOTAL: 201 MG/DL
AMB EXT HDL CHOLESTEROL: 65 MG/DL
AMB EXT HGBA1C: 4.8 %
AMB EXT LDL CHOLESTEROL, DIRECT: 118 MG/DL
AMB EXT TRIGLYCERIDES: 99 MG/DL

## 2022-06-24 ENCOUNTER — TELEPHONE (OUTPATIENT)
Dept: INTERNAL MEDICINE CLINIC | Facility: CLINIC | Age: 47
End: 2022-06-24

## 2022-06-24 NOTE — TELEPHONE ENCOUNTER
[facility] Screening Report received and placed on CB desk for review. Also , entered lab results.

## 2022-06-28 ENCOUNTER — PATIENT MESSAGE (OUTPATIENT)
Dept: INTERNAL MEDICINE CLINIC | Facility: CLINIC | Age: 47
End: 2022-06-28

## 2022-06-28 ENCOUNTER — TELEPHONE (OUTPATIENT)
Dept: INTERNAL MEDICINE CLINIC | Facility: CLINIC | Age: 47
End: 2022-06-28

## 2022-06-28 DIAGNOSIS — Z13.228 SCREENING FOR METABOLIC DISORDER: ICD-10-CM

## 2022-06-28 DIAGNOSIS — Z13.220 SCREENING FOR LIPID DISORDERS: ICD-10-CM

## 2022-06-28 DIAGNOSIS — Z00.00 ROUTINE GENERAL MEDICAL EXAMINATION AT A HEALTH CARE FACILITY: Primary | ICD-10-CM

## 2022-06-28 DIAGNOSIS — Z13.29 SCREENING FOR THYROID DISORDER: ICD-10-CM

## 2022-06-28 DIAGNOSIS — Z13.0 SCREENING FOR DISORDER OF BLOOD AND BLOOD-FORMING ORGANS: ICD-10-CM

## 2022-06-28 NOTE — TELEPHONE ENCOUNTER
CPE   Future Appointments   Date Time Provider Nathanael Neal   7/28/2022  9:00 AM Cj Singleton PA-C [facility]      Orders to  [facility]  aware must fast no call back required

## 2022-06-28 NOTE — TELEPHONE ENCOUNTER
Lipids, a1c, and looks like parts of a cmp were completed. CBC, b12, vit D, TSH completed 10/11/21. CB, would you like any labs prior to cpe on 7/28/22?

## 2022-06-28 NOTE — TELEPHONE ENCOUNTER
From: India Millan  To: Jake Victor PA-C  Sent: 6/28/2022 3:43 PM CDT  Subject: additional blood work needed    Discoverly! I'm coming in for my yearly check-up with you on 7/28. Here are the results from my blood test on 6/21 that I had to get for my insurance. Is there any other tests you wanted to order before my appointment? Thanks!     Tina Campbell

## 2022-07-25 ENCOUNTER — TELEPHONE (OUTPATIENT)
Dept: INTERNAL MEDICINE CLINIC | Facility: CLINIC | Age: 47
End: 2022-07-25

## 2022-07-25 NOTE — TELEPHONE ENCOUNTER
What is the patient's vaccine status? 2 shots 1 booster  Is the patient symptomatic? yes  If so, what are the symptoms and when did symptoms start? Last Friday-chills,fever, congestion in chest,cough  When was the exposure? On vacation-yes on wed  Were one or both people involved unmasked for more than 15 minutes when exposed? NO  Have you had a covid test (home, pharmacy or within a healthcare system)? On sunday  If you have results we can view please bring those with you  Pt just wondering what she can take OTC?

## 2022-08-08 ENCOUNTER — OFFICE VISIT (OUTPATIENT)
Dept: INTERNAL MEDICINE CLINIC | Facility: CLINIC | Age: 47
End: 2022-08-08
Payer: COMMERCIAL

## 2022-08-08 VITALS
OXYGEN SATURATION: 98 % | HEART RATE: 62 BPM | WEIGHT: 182 LBS | TEMPERATURE: 98 F | DIASTOLIC BLOOD PRESSURE: 60 MMHG | SYSTOLIC BLOOD PRESSURE: 96 MMHG | BODY MASS INDEX: 29.25 KG/M2 | HEIGHT: 66 IN

## 2022-08-08 DIAGNOSIS — E55.9 VITAMIN D DEFICIENCY: ICD-10-CM

## 2022-08-08 DIAGNOSIS — K21.9 GASTROESOPHAGEAL REFLUX DISEASE WITHOUT ESOPHAGITIS: ICD-10-CM

## 2022-08-08 DIAGNOSIS — Z00.00 ROUTINE GENERAL MEDICAL EXAMINATION AT A HEALTH CARE FACILITY: Primary | ICD-10-CM

## 2022-08-08 DIAGNOSIS — E53.8 VITAMIN B 12 DEFICIENCY: ICD-10-CM

## 2022-08-08 DIAGNOSIS — F41.9 ANXIETY: ICD-10-CM

## 2022-08-08 DIAGNOSIS — Z87.442 HISTORY OF NEPHROLITHIASIS: ICD-10-CM

## 2022-08-08 PROCEDURE — 3074F SYST BP LT 130 MM HG: CPT | Performed by: NURSE PRACTITIONER

## 2022-08-08 PROCEDURE — 3008F BODY MASS INDEX DOCD: CPT | Performed by: NURSE PRACTITIONER

## 2022-08-08 PROCEDURE — 3078F DIAST BP <80 MM HG: CPT | Performed by: NURSE PRACTITIONER

## 2022-08-08 PROCEDURE — 99396 PREV VISIT EST AGE 40-64: CPT | Performed by: NURSE PRACTITIONER

## 2022-09-27 ENCOUNTER — MED REC SCAN ONLY (OUTPATIENT)
Dept: INTERNAL MEDICINE CLINIC | Facility: CLINIC | Age: 47
End: 2022-09-27

## 2022-11-19 ENCOUNTER — PATIENT MESSAGE (OUTPATIENT)
Dept: INTERNAL MEDICINE CLINIC | Facility: CLINIC | Age: 47
End: 2022-11-19

## 2022-11-21 NOTE — TELEPHONE ENCOUNTER
From: Jenn Mcfarlane  To: CELINA Rdz  Sent: 11/19/2022 8:50 AM [facility]  Subject: Family History Record    Michael Mayfield! I was just looking at my family history record and wanted to make something more clear. My dad was diagnosed with CLL around age 61 but never had to receive any treatment for it. His dad also had CLL for a long time and lived into his [de-identified]. In June 2019 (at age 76), my dad was diagnosed with Liver Bile Duct Cancer and is still receiving treatment for that and has been stable for past couple of years. My daughter (age 13) has ALL, was diagnosed in December 2020, and has been in remission since end of January 2021 and ends treatment in about 4 months on March 31, 2023. Both of my parents are still alive at age 68 and 68. I am applying for life insurance again soon and they will be contacting you guys so just wanted to make sure everything there was correct. One  did ask me if anyone had cancer in my family had cancer and at what age. So wanted to make sure your records were clear on that.

## 2023-03-23 ENCOUNTER — TELEPHONE (OUTPATIENT)
Dept: INTERNAL MEDICINE CLINIC | Facility: CLINIC | Age: 48
End: 2023-03-23

## 2023-03-23 NOTE — TELEPHONE ENCOUNTER
Spoke to pt. States she does not think he finger is broken, but would like it checked out. States her left pointer finger has some mild redness/swelling. No injury, wound, drainage, or fever. Nail is normal per pt. 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 Cancellation for 3:40pm occurred while on phone and was able to schedule pt.      Future Appointments   Date Time Provider Nathanael Neal   3/24/2023  3:40 PM Ian Quintero MD [facility]

## 2023-03-23 NOTE — TELEPHONE ENCOUNTER
Pt called requesting an appt for possible \"broken\" finger. 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

## 2023-03-24 ENCOUNTER — APPOINTMENT (OUTPATIENT)
Dept: GENERAL RADIOLOGY | Age: 48
End: 2023-03-24
Attending: NURSE PRACTITIONER
Payer: COMMERCIAL

## 2023-03-24 ENCOUNTER — HOSPITAL ENCOUNTER (OUTPATIENT)
Age: 48
Discharge: HOME | End: 2023-03-24
Payer: COMMERCIAL

## 2023-03-24 VITALS
HEART RATE: 92 BPM | SYSTOLIC BLOOD PRESSURE: 106 MMHG | TEMPERATURE: 100 F | RESPIRATION RATE: 18 BRPM | OXYGEN SATURATION: 98 % | DIASTOLIC BLOOD PRESSURE: 66 MMHG

## 2023-03-24 DIAGNOSIS — M79.645 PAIN OF FINGER OF LEFT HAND: Primary | ICD-10-CM

## 2023-03-24 DIAGNOSIS — L03.019 ONYCHIA AND PARONYCHIA OF FINGER: ICD-10-CM

## 2023-03-24 PROCEDURE — 99213 OFFICE O/P EST LOW 20 MIN: CPT | Performed by: NURSE PRACTITIONER

## 2023-03-24 PROCEDURE — 73140 X-RAY EXAM OF FINGER(S): CPT | Performed by: NURSE PRACTITIONER

## 2023-03-24 RX ORDER — FLUCONAZOLE 150 MG/1
150 TABLET ORAL ONCE
Qty: 1 TABLET | Refills: 0 | Status: SHIPPED | OUTPATIENT
Start: 2023-03-24 | End: 2023-03-24

## 2023-03-24 RX ORDER — CEPHALEXIN 500 MG/1
500 CAPSULE ORAL 2 TIMES DAILY
Qty: 14 CAPSULE | Refills: 0 | Status: SHIPPED | OUTPATIENT
Start: 2023-03-24 | End: 2023-03-31

## 2023-03-24 NOTE — DISCHARGE INSTRUCTIONS
Use antibiotics as directed. Warm soaks to your finger 4-5 times daily for 20 minutes. If the area swells or increases in size return for incision and drainage. gait, locomotion, and balance/aerobic capacity/endurance

## 2023-03-31 ENCOUNTER — OFFICE VISIT (OUTPATIENT)
Dept: INTERNAL MEDICINE CLINIC | Facility: CLINIC | Age: 48
End: 2023-03-31
Payer: COMMERCIAL

## 2023-03-31 ENCOUNTER — HOSPITAL ENCOUNTER (OUTPATIENT)
Dept: GENERAL RADIOLOGY | Age: 48
Discharge: HOME | End: 2023-03-31
Attending: PHYSICIAN ASSISTANT
Payer: COMMERCIAL

## 2023-03-31 VITALS
SYSTOLIC BLOOD PRESSURE: 100 MMHG | BODY MASS INDEX: 29.89 KG/M2 | HEIGHT: 66 IN | TEMPERATURE: 98 F | HEART RATE: 90 BPM | DIASTOLIC BLOOD PRESSURE: 58 MMHG | WEIGHT: 186 LBS

## 2023-03-31 DIAGNOSIS — M79.602 LEFT ARM PAIN: Primary | ICD-10-CM

## 2023-03-31 DIAGNOSIS — M79.602 LEFT ARM PAIN: ICD-10-CM

## 2023-03-31 LAB
ATRIAL RATE: 69 BPM
P AXIS: 33 DEGREES
P-R INTERVAL: 146 MS
Q-T INTERVAL: 372 MS
QRS DURATION: 76 MS
QTC CALCULATION (BEZET): 398 MS
R AXIS: 47 DEGREES
T AXIS: 66 DEGREES
VENTRICULAR RATE: 69 BPM

## 2023-03-31 PROCEDURE — 3074F SYST BP LT 130 MM HG: CPT | Performed by: PHYSICIAN ASSISTANT

## 2023-03-31 PROCEDURE — 3078F DIAST BP <80 MM HG: CPT | Performed by: PHYSICIAN ASSISTANT

## 2023-03-31 PROCEDURE — 3008F BODY MASS INDEX DOCD: CPT | Performed by: PHYSICIAN ASSISTANT

## 2023-03-31 PROCEDURE — 72040 X-RAY EXAM NECK SPINE 2-3 VW: CPT | Performed by: PHYSICIAN ASSISTANT

## 2023-03-31 PROCEDURE — 93000 ELECTROCARDIOGRAM COMPLETE: CPT | Performed by: PHYSICIAN ASSISTANT

## 2023-03-31 PROCEDURE — 99213 OFFICE O/P EST LOW 20 MIN: CPT | Performed by: PHYSICIAN ASSISTANT

## 2023-03-31 RX ORDER — METHYLPREDNISOLONE 4 MG/1
TABLET ORAL
Qty: 1 EACH | Refills: 0 | Status: SHIPPED | OUTPATIENT
Start: 2023-03-31

## 2023-04-03 ENCOUNTER — TELEPHONE (OUTPATIENT)
Dept: INTERNAL MEDICINE CLINIC | Facility: CLINIC | Age: 48
End: 2023-04-03

## 2023-04-03 DIAGNOSIS — M79.602 LEFT ARM PAIN: Primary | ICD-10-CM

## 2023-04-03 NOTE — TELEPHONE ENCOUNTER
Patient calling seen by CS last week and was told to call back if the pain has not resolved. Patient states she is taking Tylenol.

## 2023-04-03 NOTE — TELEPHONE ENCOUNTER
LOV 3/31/23 with CS. Steroids Medrol dose pack day 3, icing. Pain is still the same. Trying to rest, nerve pain elbow down to hand. Pt states she was told to call if her pain did not improve in a couple of days. CS, how to proceed?

## 2023-04-04 NOTE — TELEPHONE ENCOUNTER
Pt reports has some improvement today. Will continue meds and schedule MRI. She's aware it may take some time for authorization.

## 2023-04-17 ENCOUNTER — HOSPITAL ENCOUNTER (OUTPATIENT)
Dept: MRI IMAGING | Facility: HOSPITAL | Age: 48
Discharge: HOME | End: 2023-04-17
Attending: PHYSICIAN ASSISTANT
Payer: COMMERCIAL

## 2023-04-17 DIAGNOSIS — M79.602 LEFT ARM PAIN: ICD-10-CM

## 2023-04-17 PROCEDURE — 72141 MRI NECK SPINE W/O DYE: CPT | Performed by: PHYSICIAN ASSISTANT

## 2023-04-18 ENCOUNTER — PATIENT MESSAGE (OUTPATIENT)
Dept: INTERNAL MEDICINE CLINIC | Facility: CLINIC | Age: 48
End: 2023-04-18

## 2023-04-19 NOTE — TELEPHONE ENCOUNTER
From: Emerson Rogers  To: Mike Wong PA-C  Sent: 4/18/2023 9:33 PM CDT  Subject: MRI results    Rosalia Mccall! Saw my MRI results. I had trouble understanding it so can you let me know your analysis? Also, I am still having left arm pain though that is somewhat better. It is worse in the morning usually. I haven't needed to take pain meds too often. But my upper arm, left shoulder and neck now actually bother me more than my hand and wrist (though they sometimes still bother me). It's like the pain just moved up from my finger, hand, wrist and now to my upper arm, shoulder and neck. I also have had some dizziness that started since I saw you in the office. Mostly when I get out of bed or up from laying down. I also have felt kind of overall not great (hard to describe-just icky feeling) and more tired than usual after work. So if MRI of neck looks ok, is there anything else that could be causing this? Thanks!    Denny Rivera

## 2023-04-20 NOTE — TELEPHONE ENCOUNTER
MRI does not show anything significant. Yes, needs f/u ov. To the urgent care or ER if dizziness worsens.

## 2023-04-21 NOTE — TELEPHONE ENCOUNTER
Patient scheduled with CS    Future Appointments   Date Time Provider Nathanael Neal   5/4/2023  4:40 PM Vamshi Rashid., CHEIKH [facility]

## 2023-05-01 ENCOUNTER — PATIENT MESSAGE (OUTPATIENT)
Dept: INTERNAL MEDICINE CLINIC | Facility: CLINIC | Age: 48
End: 2023-05-01

## 2023-05-02 ENCOUNTER — TELEPHONE (OUTPATIENT)
Dept: INTERNAL MEDICINE CLINIC | Facility: CLINIC | Age: 48
End: 2023-05-02

## 2023-05-02 NOTE — TELEPHONE ENCOUNTER
Future Appointments   Date Time Provider Nathanael Neal   6/6/2023  9:00 AM CELINA Milligan [facility]     Informed must fast no call back required.  Orders to [facility]

## 2023-05-02 NOTE — TELEPHONE ENCOUNTER
From: Joon Richard  To: Magan Reyes PA-C  Sent: 5/1/2023 10:17 PM CDT  Subject: follow up appt    Hi Domingo Borrego! My pain actually has completely gone away now-none in my hand, wrist, arm, shoulder or neck. It's been gone for at least a week. I am having still some dizziness when I get up from laying down or get up quickly. Not sure if it was related to the past pain in my left arm or not. I have had that kind of dizziness from time to time. I did have a follow-up appointment this week but I just cancelled it because I have a conflict. I'll try to re-book tomorrow. 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      Thanks,    Tono Polk

## 2023-05-03 NOTE — TELEPHONE ENCOUNTER
Please call to triage the dizziness. It sounds like this has been going on for quite awhile and is overall stable. Ok to monitor for now if this is the case. Otherwise, recommend sooner ov.

## 2023-05-05 NOTE — TELEPHONE ENCOUNTER
Noted and agree.      Future Appointments   Date Time Provider Nathanael Neal   5/9/2023  5:00 PM Manuela Kirby PA-C [facility]   6/6/2023  9:00 AM Crisoforo Hashimoto, APRN [facility]

## 2023-05-05 NOTE — TELEPHONE ENCOUNTER
Called and spoke to pt. Per pt, this has happened in the past, but now has returned. Has had intermittent dizziness for past 3 weeks, mostly with position changes. No other sxs, arm pain has resolved. Pt said that she has been told in the past her BP was low, so is questioning if this could be the cause. Does not have BP cuff at home. Offered appt on Monday, but pt said she works. She will proceed with appt on Tuesday. UC/ER warnings given.      ALTON HASTINGS

## 2023-05-09 ENCOUNTER — OFFICE VISIT (OUTPATIENT)
Dept: INTERNAL MEDICINE CLINIC | Facility: CLINIC | Age: 48
End: 2023-05-09
Payer: COMMERCIAL

## 2023-05-09 VITALS
DIASTOLIC BLOOD PRESSURE: 64 MMHG | HEART RATE: 80 BPM | SYSTOLIC BLOOD PRESSURE: 108 MMHG | BODY MASS INDEX: 30.37 KG/M2 | WEIGHT: 189 LBS | HEIGHT: 66 IN | TEMPERATURE: 97 F

## 2023-05-09 DIAGNOSIS — R42 VERTIGO: ICD-10-CM

## 2023-05-09 DIAGNOSIS — M79.602 LEFT ARM PAIN: Primary | ICD-10-CM

## 2023-05-09 PROCEDURE — 3008F BODY MASS INDEX DOCD: CPT | Performed by: PHYSICIAN ASSISTANT

## 2023-05-09 PROCEDURE — 3074F SYST BP LT 130 MM HG: CPT | Performed by: PHYSICIAN ASSISTANT

## 2023-05-09 PROCEDURE — 3078F DIAST BP <80 MM HG: CPT | Performed by: PHYSICIAN ASSISTANT

## 2023-05-09 PROCEDURE — 99214 OFFICE O/P EST MOD 30 MIN: CPT | Performed by: PHYSICIAN ASSISTANT

## 2023-05-15 LAB — AMB EXT GLUCOSE: 95 MG/DL

## 2023-05-31 LAB
AMB EXT BUN: 15 MG/DL
AMB EXT CALCIUM: 9.1
AMB EXT CHOL/HDL RATIO: 2.5
AMB EXT CHOLESTEROL, TOTAL: 201 MG/DL
AMB EXT CMP AST: 14 U/L
AMB EXT CREATININE: 0.82 MG/DL
AMB EXT HDL CHOLESTEROL: 81 MG/DL
AMB EXT HGBA1C: 4.9 %
AMB EXT LDL CHOLESTEROL, DIRECT: 106 MG/DL
AMB EXT TRIGLYCERIDES: 78 MG/DL
AMB EXT VLDL: 14 MG/DL

## 2023-07-19 ENCOUNTER — PATIENT MESSAGE (OUTPATIENT)
Dept: INTERNAL MEDICINE CLINIC | Facility: CLINIC | Age: 48
End: 2023-07-19

## 2023-07-19 DIAGNOSIS — Z13.0 SCREENING FOR DISORDER OF BLOOD AND BLOOD-FORMING ORGANS: ICD-10-CM

## 2023-07-19 DIAGNOSIS — Z00.00 ROUTINE GENERAL MEDICAL EXAMINATION AT A HEALTH CARE FACILITY: Primary | ICD-10-CM

## 2023-07-19 DIAGNOSIS — Z13.220 SCREENING FOR LIPID DISORDERS: ICD-10-CM

## 2023-07-19 DIAGNOSIS — Z13.228 SCREENING FOR METABOLIC DISORDER: ICD-10-CM

## 2023-07-19 DIAGNOSIS — Z13.29 SCREENING FOR THYROID DISORDER: ICD-10-CM

## 2023-07-20 NOTE — TELEPHONE ENCOUNTER
From: Khurram Coburn  To: CELINA Hernandez  Sent: 7/19/2023 1:24 PM CDT  Subject: need order for blood work    Hi! I called THE [facility] scheduling to make an appointment for my bloodwork for my annual visit on 7/31 and they didn't have an order for me. Can someone send it over? Thanks!     Sung Mcadams

## 2023-07-27 ENCOUNTER — LAB ENCOUNTER (OUTPATIENT)
Dept: LAB | Age: 48
End: 2023-07-27
Attending: NURSE PRACTITIONER
Payer: COMMERCIAL

## 2023-07-27 DIAGNOSIS — Z13.29 SCREENING FOR THYROID DISORDER: ICD-10-CM

## 2023-07-27 DIAGNOSIS — Z13.228 SCREENING FOR METABOLIC DISORDER: ICD-10-CM

## 2023-07-27 DIAGNOSIS — Z00.00 ROUTINE GENERAL MEDICAL EXAMINATION AT A HEALTH CARE FACILITY: ICD-10-CM

## 2023-07-27 DIAGNOSIS — Z13.220 SCREENING FOR LIPID DISORDERS: ICD-10-CM

## 2023-07-27 DIAGNOSIS — Z13.0 SCREENING FOR DISORDER OF BLOOD AND BLOOD-FORMING ORGANS: ICD-10-CM

## 2023-07-27 LAB
ALBUMIN SERPL-MCNC: 3.3 G/DL (ref 3.4–5)
ALBUMIN/GLOB SERPL: 0.9 {RATIO} (ref 1–2)
ALP LIVER SERPL-CCNC: 65 U/L
ALT SERPL-CCNC: 17 U/L
ANION GAP SERPL CALC-SCNC: 6 MMOL/L (ref 0–18)
AST SERPL-CCNC: 13 U/L (ref 15–37)
BASOPHILS # BLD AUTO: 0.07 X10(3) UL (ref 0–0.2)
BASOPHILS NFR BLD AUTO: 0.8 %
BILIRUB SERPL-MCNC: 0.7 MG/DL (ref 0.1–2)
BUN BLD-MCNC: 14 MG/DL (ref 7–18)
CALCIUM BLD-MCNC: 8.4 MG/DL (ref 8.5–10.1)
CHLORIDE SERPL-SCNC: 109 MMOL/L (ref 98–112)
CHOLEST SERPL-MCNC: 182 MG/DL (ref ?–200)
CO2 SERPL-SCNC: 22 MMOL/L (ref 21–32)
CREAT BLD-MCNC: 0.79 MG/DL
EGFRCR SERPLBLD CKD-EPI 2021: 93 ML/MIN/1.73M2 (ref 60–?)
EOSINOPHIL # BLD AUTO: 0.27 X10(3) UL (ref 0–0.7)
EOSINOPHIL NFR BLD AUTO: 3 %
ERYTHROCYTE [DISTWIDTH] IN BLOOD BY AUTOMATED COUNT: 12.1 %
FASTING PATIENT LIPID ANSWER: YES
FASTING STATUS PATIENT QL REPORTED: YES
GLOBULIN PLAS-MCNC: 3.5 G/DL (ref 2.8–4.4)
GLUCOSE BLD-MCNC: 86 MG/DL (ref 70–99)
HCT VFR BLD AUTO: 38.5 %
HDLC SERPL-MCNC: 75 MG/DL (ref 40–59)
HGB BLD-MCNC: 13.7 G/DL
IMM GRANULOCYTES # BLD AUTO: 0.02 X10(3) UL (ref 0–1)
IMM GRANULOCYTES NFR BLD: 0.2 %
LDLC SERPL CALC-MCNC: 92 MG/DL (ref ?–100)
LYMPHOCYTES # BLD AUTO: 2.68 X10(3) UL (ref 1–4)
LYMPHOCYTES NFR BLD AUTO: 30.1 %
MCH RBC QN AUTO: 31.5 PG (ref 26–34)
MCHC RBC AUTO-ENTMCNC: 35.6 G/DL (ref 31–37)
MCV RBC AUTO: 88.5 FL
MONOCYTES # BLD AUTO: 0.62 X10(3) UL (ref 0.1–1)
MONOCYTES NFR BLD AUTO: 7 %
NEUTROPHILS # BLD AUTO: 5.23 X10 (3) UL (ref 1.5–7.7)
NEUTROPHILS # BLD AUTO: 5.23 X10(3) UL (ref 1.5–7.7)
NEUTROPHILS NFR BLD AUTO: 58.9 %
NONHDLC SERPL-MCNC: 107 MG/DL (ref ?–130)
OSMOLALITY SERPL CALC.SUM OF ELEC: 284 MOSM/KG (ref 275–295)
PLATELET # BLD AUTO: 331 10(3)UL (ref 150–450)
POTASSIUM SERPL-SCNC: 3.8 MMOL/L (ref 3.5–5.1)
PROT SERPL-MCNC: 6.8 G/DL (ref 6.4–8.2)
RBC # BLD AUTO: 4.35 X10(6)UL
SODIUM SERPL-SCNC: 137 MMOL/L (ref 136–145)
TRIGL SERPL-MCNC: 80 MG/DL (ref 30–149)
TSI SER-ACNC: 1.45 MIU/ML (ref 0.36–3.74)
VLDLC SERPL CALC-MCNC: 13 MG/DL (ref 0–30)
WBC # BLD AUTO: 8.9 X10(3) UL (ref 4–11)

## 2023-07-27 PROCEDURE — 84443 ASSAY THYROID STIM HORMONE: CPT

## 2023-07-27 PROCEDURE — 85025 COMPLETE CBC W/AUTO DIFF WBC: CPT

## 2023-07-27 PROCEDURE — 80053 COMPREHEN METABOLIC PANEL: CPT

## 2023-07-27 PROCEDURE — 36415 COLL VENOUS BLD VENIPUNCTURE: CPT

## 2023-07-27 PROCEDURE — 80061 LIPID PANEL: CPT

## 2023-07-31 ENCOUNTER — OFFICE VISIT (OUTPATIENT)
Dept: INTERNAL MEDICINE CLINIC | Facility: CLINIC | Age: 48
End: 2023-07-31
Payer: COMMERCIAL

## 2023-07-31 VITALS
RESPIRATION RATE: 18 BRPM | OXYGEN SATURATION: 97 % | DIASTOLIC BLOOD PRESSURE: 60 MMHG | HEIGHT: 66 IN | WEIGHT: 189 LBS | TEMPERATURE: 97 F | HEART RATE: 90 BPM | SYSTOLIC BLOOD PRESSURE: 108 MMHG | BODY MASS INDEX: 30.37 KG/M2

## 2023-07-31 DIAGNOSIS — Z00.00 ROUTINE GENERAL MEDICAL EXAMINATION AT A HEALTH CARE FACILITY: Primary | ICD-10-CM

## 2023-07-31 PROCEDURE — 3008F BODY MASS INDEX DOCD: CPT | Performed by: NURSE PRACTITIONER

## 2023-07-31 PROCEDURE — 3078F DIAST BP <80 MM HG: CPT | Performed by: NURSE PRACTITIONER

## 2023-07-31 PROCEDURE — 99396 PREV VISIT EST AGE 40-64: CPT | Performed by: NURSE PRACTITIONER

## 2023-07-31 PROCEDURE — 3074F SYST BP LT 130 MM HG: CPT | Performed by: NURSE PRACTITIONER

## 2023-08-07 ENCOUNTER — PATIENT MESSAGE (OUTPATIENT)
Dept: INTERNAL MEDICINE CLINIC | Facility: CLINIC | Age: 48
End: 2023-08-07

## 2023-08-07 DIAGNOSIS — Z82.79 FAMILY HISTORY OF CONGENITAL HEART DEFECT: Primary | ICD-10-CM

## 2023-08-08 NOTE — TELEPHONE ENCOUNTER
Per LOV note, \"She will email me with her daughters congenital diagnosis and consider ECHO. She will also speak to her daughter's cardiologist\". Routing to SD to update.

## 2023-08-08 NOTE — TELEPHONE ENCOUNTER
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 Thanks!     Kel Henriquez

## 2023-08-09 ENCOUNTER — TELEPHONE (OUTPATIENT)
Dept: INTERNAL MEDICINE CLINIC | Facility: CLINIC | Age: 48
End: 2023-08-09

## 2023-08-09 NOTE — TELEPHONE ENCOUNTER
Biometric screen completed through employer health screen. Abstracted and placed on SD desk for review.

## 2023-10-18 ENCOUNTER — TELEPHONE (OUTPATIENT)
Dept: INTERNAL MEDICINE CLINIC | Facility: CLINIC | Age: 48
End: 2023-10-18

## 2023-10-18 NOTE — TELEPHONE ENCOUNTER
From: Ira May  To: Porsche Leong  Sent: 10/17/2023  2:31 PM CDT  Subject: echocardiogram    Michael Mayfield!    I just wanted to let you know that my insurance still has not approved the echocardiogram.  They said they are waiting on receiving some more information from you.  Please let me know once you send that info.  I changed the appointment from this coming Saturday to November 27th so hopefully it can be approved by then.    Thanks!  Ira Zuñiga,    Will you be able to follow up on this test?  Referral #[id number]  ordered on 8/8/23 by Tran Leong APSTEPHEN [id number].    Please let us know the outcome.    Thank you,  Emelina NEUMANN

## 2023-10-26 ENCOUNTER — PATIENT MESSAGE (OUTPATIENT)
Dept: CASE MANAGEMENT | Age: 48
End: 2023-10-26

## 2023-11-27 ENCOUNTER — HOSPITAL ENCOUNTER (OUTPATIENT)
Dept: CV DIAGNOSTICS | Facility: HOSPITAL | Age: 48
Discharge: HOME | End: 2023-11-27
Attending: NURSE PRACTITIONER
Payer: COMMERCIAL

## 2023-11-27 DIAGNOSIS — Z82.79 FAMILY HISTORY OF CONGENITAL HEART DEFECT: ICD-10-CM

## 2023-11-27 PROCEDURE — 93306 TTE W/DOPPLER COMPLETE: CPT | Performed by: NURSE PRACTITIONER

## 2023-12-12 ENCOUNTER — TELEPHONE (OUTPATIENT)
Dept: INTERNAL MEDICINE CLINIC | Facility: CLINIC | Age: 48
End: 2023-12-12

## 2024-01-04 ENCOUNTER — ORDER TRANSCRIPTION (OUTPATIENT)
Dept: ADMINISTRATIVE | Facility: HOSPITAL | Age: 49
End: 2024-01-04

## 2024-01-04 ENCOUNTER — HOSPITAL ENCOUNTER (OUTPATIENT)
Dept: CT IMAGING | Facility: HOSPITAL | Age: 49
Discharge: HOME | End: 2024-01-04
Attending: INTERNAL MEDICINE

## 2024-01-04 VITALS — WEIGHT: 185 LBS | BODY MASS INDEX: 29.73 KG/M2 | HEIGHT: 66 IN

## 2024-01-04 DIAGNOSIS — Z13.6 SCREENING FOR CARDIOVASCULAR CONDITION: Primary | ICD-10-CM

## 2024-01-04 DIAGNOSIS — Z13.6 SCREENING FOR CARDIOVASCULAR CONDITION: ICD-10-CM

## 2024-01-04 LAB
GLUCOSE POC: 83 MG/DL (ref 70–100)
HDL POC: 66 MG/DL (ref 40–60)
TC/HDL RATIO: 2 (ref 0–5)
TOTAL CHOLESTEROL POC: 158 MG/DL (ref 0–200)

## 2024-01-04 NOTE — PROGRESS NOTES
Date of Service 1/4/2024    ARNOLD MEZA  Date of Birth 3/23/1975    Patient Age: 48 year old    PCP: Adam Schriedel, MD  [facility] [address]  [address] [place] [id number]    Heart Scan Consult  Preliminary Heart Scan Score: 0    Previous Screening  Heart Scan Completed Previously: No        Peripheral Vascular Scan Completed Previously: No          Risk Factors  Personal Risk Factors  Non-alterable Risk Factors:  (Dr Conteh Rec)  Alterable Risk Factors: Stress      Body Mass Index  Body mass index is 29.86 kg/m².    Blood Pressure  Blood Pressure measurement declined during this encounter.  (Normal =< 120/80,  Elevated = 120-129/ >80,  High Stage1 130-139/80-89 , Stage2 >140/>90)    Lipid Profile  Patient was in fasting state: Yes    Cholesterol: 158, done on 1/4/2024.  HDL Cholesterol: 66, done on 1/4/2024.  LDL Cholesterol: NA done on 1/4/2024  TriGlycerides ,45, done on 1/4/2024     Cholesterol Goals  Value   Total  =< 200   HDL  = > 45 Men = > 55 Women   LDL   =< 100   Triglycerides  =< 150       Glucose and Hemoglobin A1C  Lab Results   Component Value Date    GLU 83 01/04/2024    A1C 4.9 05/31/2023     (Normal Fasting Glucose < 100mg/dl )    Nurse Review  Risk factor information and results reviewed with Nurse: Yes    Recommended Follow Up:  Consult your physician regarding:: Final Heart Scan Report;Discuss potential for Incidental Finding      Recommendations for Change:  Nutrition Changes: Low Saturated Fat;Low Fat Dairy;Low Salt Eating;Increase Fiber         Exercise: Enhance Current Program         Weight Management: Decrease Current Weight    Stress Management: Adopt Stress Management Techniques    Repeat Heart Scan: 5 years if Calcium Score is 0.0;Discuss with your Physician              [facility] Recommended Resources:  Recommended Resources: Upcoming Classes, Medical Services and Health Library [email or web address]            Na FUCHS RN        Please Contact the Nurse Heart Line with any Questions  or Concerns [phone].

## 2024-01-15 ENCOUNTER — OFFICE VISIT (OUTPATIENT)
Dept: INTERNAL MEDICINE CLINIC | Facility: CLINIC | Age: 49
End: 2024-01-15
Payer: COMMERCIAL

## 2024-01-15 VITALS
DIASTOLIC BLOOD PRESSURE: 68 MMHG | HEART RATE: 84 BPM | WEIGHT: 181 LBS | TEMPERATURE: 98 F | RESPIRATION RATE: 16 BRPM | HEIGHT: 66 IN | BODY MASS INDEX: 29.09 KG/M2 | SYSTOLIC BLOOD PRESSURE: 110 MMHG | OXYGEN SATURATION: 98 %

## 2024-01-15 DIAGNOSIS — H65.93 FLUID LEVEL BEHIND TYMPANIC MEMBRANE OF BOTH EARS: Primary | ICD-10-CM

## 2024-01-15 RX ORDER — METHYLPREDNISOLONE 4 MG/1
TABLET ORAL
Qty: 1 EACH | Refills: 0 | Status: SHIPPED | OUTPATIENT
Start: 2024-01-15

## 2024-01-15 NOTE — PROGRESS NOTES
Ira May  3/23/1975    Chief Complaint   Patient presents with    Ear Problem     TA RM14     SUBJECTIVE   Ira May is a 48 year old female who presents for sensation of ear fullness L > R for the last 2-3 weeks. No other URI symptoms. Denies fevers, chills, cough, congestion, etc.    She had COVID the last week of Dec. Sick for 3 d and then started to feel better.    Review of Systems   Review of Systems   No f/c/chest pain or sob. No cough. No abd pain/n/v/d. No ha or dizziness. No numbness, tingling, or weakness. No other complaints today.    OBJECTIVE:   /68   Pulse 84   Temp 97.5 °F (36.4 °C) (Temporal)   Resp 16   Ht 5' 6\" (1.676 m)   Wt 181 lb (82.1 kg)   LMP 07/20/2023 (Exact Date)   SpO2 98%   BMI 29.21 kg/m²   Physical Exam   Constitutional: Oriented to person, place, and time. No distress.   HEENT:  Normocephalic and atraumatic. Bilateral middle ear effusions otherwise no erythema. Nose normal. Oropharynx is clear and moist.   Eyes: Conjunctivae wnl.   Neck: Normal range of motion. Neck supple.   Cardiovascular: Normal rate, regular rhythm and intact distal pulses.  No murmur, rubs or gallops.   Pulmonary/Chest: Effort normal and breath sounds normal. No respiratory distress.    Lab Results   Component Value Date    GLU 83 01/04/2024    BUN 14 07/27/2023    CREATSERUM 0.79 07/27/2023    BUNCREA 15.6 06/24/2020    ANIONGAP 6 07/27/2023    GFRAA 83 06/24/2020    GFRNAA 72 06/24/2020    CA 8.4 (L) 07/27/2023     07/27/2023    K 3.8 07/27/2023     07/27/2023    CO2 22.0 07/27/2023    OSMOCALC 284 07/27/2023      Lab Results   Component Value Date    WBC 8.9 07/27/2023    RBC 4.35 07/27/2023    HGB 13.7 07/27/2023    HCT 38.5 07/27/2023    MCV 88.5 07/27/2023    MCH 31.5 07/27/2023    MCHC 35.6 07/27/2023    RDW 12.1 07/27/2023    .0 07/27/2023    MPV 9.6 02/23/2013      Lab Results   Component Value Date    T4F 1.3 05/13/2017    TSH 1.450 07/27/2023         ASSESSMENT AND PLAN:       ICD-10-CM    1. Fluid level behind tympanic membrane of both ears  H65.93 methylPREDNISolone (MEDROL) 4 MG Oral Tablet Therapy Pack  Likely assoc w/ recent URI. Instructed to follow up if symptoms do not improve after course of steroids.        The patient indicates understanding of these issues and agrees to the plan.  The patient is asked to return or present to the emergency room for worsening of symptoms.    TODAY'S ORDERS     No orders of the defined types were placed in this encounter.      Meds & Refills:  Requested Prescriptions     Signed Prescriptions Disp Refills    methylPREDNISolone (MEDROL) 4 MG Oral Tablet Therapy Pack 1 each 0     Sig: As directed.       Imaging & Consults:  None    No follow-ups on file.  There are no Patient Instructions on file for this visit.    All questions were answered and the patient agrees with the plan.     Thank you,  Vicenta Real, DO

## 2024-04-01 ENCOUNTER — TELEPHONE (OUTPATIENT)
Dept: INTERNAL MEDICINE CLINIC | Facility: CLINIC | Age: 49
End: 2024-04-01

## 2024-04-01 NOTE — TELEPHONE ENCOUNTER
Pt called stating earlier today she hit her head on the corner of the wall and now when she bends over she gets pain there-she feels nothing else unless she bends over-no headache or blurred vision

## 2024-04-01 NOTE — TELEPHONE ENCOUNTER
Called and spoke w/ pt. Notified SD stated can monitor symptoms and let us know if symptoms occur. Notified can cont with ice. Pt asking if we are concerned with dent. Notified I can't say since she is unsure if it was there or not before this incident. Pt stated she is pretty sure it wasn't. Notified can schedule a visit then for eval if she would like. Pt declined.

## 2024-04-01 NOTE — TELEPHONE ENCOUNTER
LOV 1/15/24     Called and spoke w/ pt. Pt stated a few hours ago she was walking up her stairs and hit her head on the corner of the wall. Denies LOC. Stated she feels a dent on the left side of her head. She is not sure if this happened from hitting her head or if this was there before. Pt asking if this could be from hitting her head. Notified it could be depending how hard she hit it but I can't say for certain. Denies break in skin. Iced it which helped. Stated she bent over to get the laundry and felt pain when bending over. Denies headache, dizziness, double/blurry vision. Just pain when bending over. Notified can continue with ice and monitor symptoms. Notified to let us know if she develops any of the above symptoms.     SD - Is there any other recommendations you have for pt?

## 2024-07-17 ENCOUNTER — MED REC SCAN ONLY (OUTPATIENT)
Dept: INTERNAL MEDICINE CLINIC | Facility: CLINIC | Age: 49
End: 2024-07-17

## 2024-07-21 ENCOUNTER — TELEPHONE (OUTPATIENT)
Dept: INTERNAL MEDICINE CLINIC | Facility: CLINIC | Age: 49
End: 2024-07-21

## 2024-07-21 DIAGNOSIS — Z12.31 ENCOUNTER FOR SCREENING MAMMOGRAM FOR MALIGNANT NEOPLASM OF BREAST: Primary | ICD-10-CM

## 2024-07-21 PROBLEM — E78.00 PURE HYPERCHOLESTEROLEMIA: Status: ACTIVE | Noted: 2024-07-21

## 2024-07-22 NOTE — TELEPHONE ENCOUNTER
Reviewed labs. . Otherwise rest are within range. Please inform patient that she is due for mammogram.

## 2024-07-23 ENCOUNTER — TELEPHONE (OUTPATIENT)
Dept: INTERNAL MEDICINE CLINIC | Facility: CLINIC | Age: 49
End: 2024-07-23

## 2024-07-23 DIAGNOSIS — Z13.0 SCREENING FOR ENDOCRINE, METABOLIC AND IMMUNITY DISORDER: ICD-10-CM

## 2024-07-23 DIAGNOSIS — Z00.00 ROUTINE GENERAL MEDICAL EXAMINATION AT A HEALTH CARE FACILITY: Primary | ICD-10-CM

## 2024-07-23 DIAGNOSIS — Z13.29 SCREENING FOR ENDOCRINE, METABOLIC AND IMMUNITY DISORDER: ICD-10-CM

## 2024-07-23 DIAGNOSIS — Z13.220 SCREENING FOR LIPOID DISORDERS: ICD-10-CM

## 2024-07-23 DIAGNOSIS — Z13.29 SCREENING FOR THYROID DISORDER: ICD-10-CM

## 2024-07-23 DIAGNOSIS — Z13.228 SCREENING FOR ENDOCRINE, METABOLIC AND IMMUNITY DISORDER: ICD-10-CM

## 2024-07-23 DIAGNOSIS — Z13.0 SCREENING FOR BLOOD DISEASE: ICD-10-CM

## 2024-07-23 NOTE — TELEPHONE ENCOUNTER
Patient called request labs prior to their annual physical.  Annual physical scheduled for   Future Appointments   Date Time Provider Department Center   8/7/2024  3:30 PM Traci Lobo PA-C [facility]      Please order labs. Patient preferred lab is [facility].  Patient informed request was sent to clinical team.  Patient informed to fast for labs.  No callback required.

## 2024-11-15 ENCOUNTER — PATIENT MESSAGE (OUTPATIENT)
Dept: INTERNAL MEDICINE CLINIC | Facility: CLINIC | Age: 49
End: 2024-11-15

## 2024-12-06 ENCOUNTER — TELEPHONE (OUTPATIENT)
Dept: INTERNAL MEDICINE CLINIC | Facility: CLINIC | Age: 49
End: 2024-12-06

## 2024-12-06 ENCOUNTER — PATIENT MESSAGE (OUTPATIENT)
Dept: INTERNAL MEDICINE CLINIC | Facility: CLINIC | Age: 49
End: 2024-12-06

## 2024-12-06 NOTE — TELEPHONE ENCOUNTER
Patient called back. Her LMP was 9/23/23. She reports insomnia, night sweats, itchy skin and increased anxiety. She states she has heard her friends talk about hormones, but is unsure if they meant hormone testing or HRT. Advised patient to discuss further at annual visit. If any additional labs or medications are recommended, they can be reviewed at visit. She is agreeable to plan.

## 2024-12-06 NOTE — TELEPHONE ENCOUNTER
BRIDGETTE with  [employer] calling with patient on the line requesting CPT codes for hormone labs to check if insurance will cover labs, no hormone labs order    No order for hormone levels entered, advised Bridgette and patient we will ask for orders and then can provide CPT codes for her to check with insurance regarding coverage    Would you like to order any hormone labs for menopause?    LOV telemedicine 7/25    Future Appointments   Date Time Provider Department Center   12/7/2024 10:00 AM  [facility]   12/12/2024  4:00 PM Traci Lobo PA-C [facility]

## 2024-12-06 NOTE — TELEPHONE ENCOUNTER
I do not think I have discussed this with pt so I am unware of sxs or situation in order to provide code to go with order.  You will need to call the pt and triage.  Is she having menopausal sxs, if so what are they?  What \"hormone leves\" is she looking to evaluate?

## 2024-12-08 ENCOUNTER — LAB ENCOUNTER (OUTPATIENT)
Dept: LAB | Facility: HOSPITAL | Age: 49
End: 2024-12-08
Attending: PHYSICIAN ASSISTANT
Payer: COMMERCIAL

## 2024-12-08 DIAGNOSIS — Z13.0 SCREENING FOR ENDOCRINE, METABOLIC AND IMMUNITY DISORDER: ICD-10-CM

## 2024-12-08 DIAGNOSIS — Z13.0 SCREENING FOR BLOOD DISEASE: ICD-10-CM

## 2024-12-08 DIAGNOSIS — Z13.29 SCREENING FOR ENDOCRINE, METABOLIC AND IMMUNITY DISORDER: ICD-10-CM

## 2024-12-08 DIAGNOSIS — Z00.00 ROUTINE GENERAL MEDICAL EXAMINATION AT A HEALTH CARE FACILITY: ICD-10-CM

## 2024-12-08 DIAGNOSIS — Z13.29 SCREENING FOR THYROID DISORDER: ICD-10-CM

## 2024-12-08 DIAGNOSIS — Z13.228 SCREENING FOR ENDOCRINE, METABOLIC AND IMMUNITY DISORDER: ICD-10-CM

## 2024-12-08 DIAGNOSIS — Z13.220 SCREENING FOR LIPOID DISORDERS: ICD-10-CM

## 2024-12-08 LAB
ALBUMIN SERPL-MCNC: 4.4 G/DL (ref 3.2–4.8)
ALBUMIN/GLOB SERPL: 1.7 {RATIO} (ref 1–2)
ALP LIVER SERPL-CCNC: 101 U/L
ALT SERPL-CCNC: 14 U/L
ANION GAP SERPL CALC-SCNC: 3 MMOL/L (ref 0–18)
AST SERPL-CCNC: 20 U/L (ref ?–34)
BASOPHILS # BLD AUTO: 0.06 X10(3) UL (ref 0–0.2)
BASOPHILS NFR BLD AUTO: 1 %
BILIRUB SERPL-MCNC: 0.9 MG/DL (ref 0.3–1.2)
BUN BLD-MCNC: 14 MG/DL (ref 9–23)
CALCIUM BLD-MCNC: 9.5 MG/DL (ref 8.7–10.4)
CHLORIDE SERPL-SCNC: 107 MMOL/L (ref 98–112)
CHOLEST SERPL-MCNC: 189 MG/DL (ref ?–200)
CO2 SERPL-SCNC: 28 MMOL/L (ref 21–32)
CREAT BLD-MCNC: 0.83 MG/DL
EGFRCR SERPLBLD CKD-EPI 2021: 87 ML/MIN/1.73M2 (ref 60–?)
EOSINOPHIL # BLD AUTO: 0.21 X10(3) UL (ref 0–0.7)
EOSINOPHIL NFR BLD AUTO: 3.5 %
ERYTHROCYTE [DISTWIDTH] IN BLOOD BY AUTOMATED COUNT: 12.4 %
FASTING PATIENT LIPID ANSWER: YES
FASTING STATUS PATIENT QL REPORTED: YES
GLOBULIN PLAS-MCNC: 2.6 G/DL (ref 2–3.5)
GLUCOSE BLD-MCNC: 89 MG/DL (ref 70–99)
HCT VFR BLD AUTO: 41 %
HDLC SERPL-MCNC: 84 MG/DL (ref 40–59)
HGB BLD-MCNC: 14.5 G/DL
IMM GRANULOCYTES # BLD AUTO: 0.02 X10(3) UL (ref 0–1)
IMM GRANULOCYTES NFR BLD: 0.3 %
LDLC SERPL CALC-MCNC: 94 MG/DL (ref ?–100)
LYMPHOCYTES # BLD AUTO: 2.22 X10(3) UL (ref 1–4)
LYMPHOCYTES NFR BLD AUTO: 36.8 %
MCH RBC QN AUTO: 31.2 PG (ref 26–34)
MCHC RBC AUTO-ENTMCNC: 35.4 G/DL (ref 31–37)
MCV RBC AUTO: 88.2 FL
MONOCYTES # BLD AUTO: 0.44 X10(3) UL (ref 0.1–1)
MONOCYTES NFR BLD AUTO: 7.3 %
NEUTROPHILS # BLD AUTO: 3.09 X10 (3) UL (ref 1.5–7.7)
NEUTROPHILS # BLD AUTO: 3.09 X10(3) UL (ref 1.5–7.7)
NEUTROPHILS NFR BLD AUTO: 51.1 %
NONHDLC SERPL-MCNC: 105 MG/DL (ref ?–130)
OSMOLALITY SERPL CALC.SUM OF ELEC: 286 MOSM/KG (ref 275–295)
PLATELET # BLD AUTO: 279 10(3)UL (ref 150–450)
POTASSIUM SERPL-SCNC: 4.2 MMOL/L (ref 3.5–5.1)
PROT SERPL-MCNC: 7 G/DL (ref 5.7–8.2)
RBC # BLD AUTO: 4.65 X10(6)UL
SODIUM SERPL-SCNC: 138 MMOL/L (ref 136–145)
TRIGL SERPL-MCNC: 60 MG/DL (ref 30–149)
TSI SER-ACNC: 0.79 UIU/ML (ref 0.55–4.78)
VLDLC SERPL CALC-MCNC: 10 MG/DL (ref 0–30)
WBC # BLD AUTO: 6 X10(3) UL (ref 4–11)

## 2024-12-08 PROCEDURE — 84443 ASSAY THYROID STIM HORMONE: CPT

## 2024-12-08 PROCEDURE — 36415 COLL VENOUS BLD VENIPUNCTURE: CPT

## 2024-12-08 PROCEDURE — 85025 COMPLETE CBC W/AUTO DIFF WBC: CPT

## 2024-12-08 PROCEDURE — 80053 COMPREHEN METABOLIC PANEL: CPT

## 2024-12-08 PROCEDURE — 80061 LIPID PANEL: CPT

## 2024-12-10 NOTE — PROGRESS NOTES
Chief Complaint   Patient presents with    Physical     Reviewed Preventative/Wellness form with patient.        HPI:  Pt presents for annual physical.  Saw Aki yesterday for well woman care.    Has order for mammo and DEXA, will do through [facility].    Aki felt thyroid was enlarged on exam.    IBS - has seen GI.  No treatments seemed to have helped thus far.  Constipation and diarrhea.  Has not tried fiber supplement.       Pt is exercising about twice a week since weather has gotten colder.  Trying to get to gym to increase.      Anxiety - continues to be elevated, was given Rx for Wellbutrin this summer but never started.  Exercise does help keep sxs under control but reconsidering starting Wellbutrin.  Again reviewed SE profile.  Lots of stress with family situations, including dtr and  fighting chronic illnesses.      Review of Systems   Constitutional: Negative for fever, chills and fatigue. No distress.  HENT: Negative for hearing loss, congestion, sore throat, neck pain and dental problem.    Eyes: Negative for pain and visual disturbance.   Respiratory: Negative for cough, chest tightness, shortness of breath and wheezing.    Cardiovascular: Negative for chest pain, palpitations and leg swelling.   Gastrointestinal: Negative for nausea, vomiting, abdominal pain, diarrhea, blood in stool   Genitourinary: Negative for dysuria, hematuria and difficulty urinating.   Musculoskeletal: Negative for myalgias, back pain, joint swelling, arthralgias and gait problem.   Skin: Negative for color change and rash.   Neurological: Negative for dizziness, syncope, weakness, numbness, tingling and headaches.   Hematological: Negative for adenopathy. Does not bruise/bleed easily.   Psychiatric/Behavioral: No depression.    Patient Active Problem List   Diagnosis    Vitamin D deficiency    Anxiety    Vitamin B 12 deficiency    History of nephrolithiasis    Gastroesophageal reflux disease without esophagitis    Pure  hypercholesterolemia       Past Medical History:    Abdominal pain    Back pain    Bloating    Blood in urine    Calculus of kidney    Constipation    Diarrhea, unspecified    Dizziness    Flatulence/gas pain/belching    Frequency of urination    Frequent urination    Frequent use of laxatives    Indigestion    Kidney stone    Microhematuria    pseudo    Normal delivery (HCC)    at [facility]    Personal history of urinary calculi    during pregnancy    Stress    Unspecified sinusitis (chronic)    Weight gain     Past Surgical History:   Procedure Laterality Date    Cystoscopy,+ureteroscopy Right 5/22/17    Cysto, Rt URS, Rt RGP, stone was already passed, no stent placement -     Cystoscopy,insert ureteral stent Left 07/2018    left ureter stent placement for large proximal stone, Dr. Leach    Esophagoscopy,biopsy  1/4/2012    Lithotripsy Left 08/09/2018    left ESWL with cysto stent removal, [facility]    Other surgical history  2018    kidney stones      Family History   Problem Relation Age of Onset    Diabetes Mother     High Blood Pressure Mother     Hypertension Mother     Other (HTN) Mother     Other (diabetes mellitus) Mother     Cancer Father 60        Leukemia, liver bile duct    Other (leukemia) Father     Other (CLL) Father 60        Chronic lymphoblastic leukemia    Other (alzheimer's disease) Maternal Grandmother     Heart Attack Maternal Grandfather     Other (Acute MI) Maternal Grandfather     Other (HTN) Maternal Grandfather     Other (emphysema) Paternal Grandmother     Cancer Paternal Grandfather         unk    Other (leukemia) Paternal Grandfather     Other (lymphodemia) Daughter         Acute lymphoblastic leukemia     Social History     Socioeconomic History    Marital status:    Tobacco Use    Smoking status: Former    Smokeless tobacco: Never   Vaping Use    Vaping status: Never Used   Substance and Sexual Activity    Alcohol use: Yes     Alcohol/week: 2.0 - 3.0 standard drinks of  alcohol     Types: 2 - 3 Standard drinks or equivalent per week     Comment: cage done 7/8/20    Drug use: No    Sexual activity: Yes   Other Topics Concern    Caffeine Concern Yes     Comment: 3-4 cups a day    Exercise Yes     Comment: 3 x a week       No current outpatient medications on file.       Allergies  Allergies[1]    Health Maintenance  Immunization History   Administered Date(s) Administered    Covid-19 Vaccine Moderna 100 mcg/0.5 ml 01/26/2021, 02/23/2021, 10/23/2021    Covid-19 Vaccine Moderna Bivalent 50mcg/0.5mL 12+ years 10/29/2022    FLULAVAL 6 months & older 0.5 ml Prefilled syringe (20387) 10/05/2017, 10/15/2018    FLUZONE 6 months and older PFS 0.5 ml (95651) 10/05/2017, 10/15/2018, 09/26/2020    FLUZONE 6-35 Mos 0.25 ml Dose Quad Split PF (40801) 10/08/2019    Flulaval, 3 Years & >, IM 09/23/2022    Fluvirin, 3 Years & >, Im 12/30/2013, 09/16/2021    Influenza 12/10/2008, 01/14/2013, 11/07/2015, 11/18/2015    Influenza Vaccine, trivalent (IIV3), 0.5mL IM 10/01/2023    Influenza(Afluria)0.5ml QIV PFS 09/24/2020    TDAP 07/06/2016     Dental Visits:  Yes  Colonoscopy:  UTD  Pap:  UTD  Mammogram: UTD      Physical Exam  /72   Pulse 64   Temp 97 °F (36.1 °C) (Temporal)   Resp 18   Ht 5' 5.98\" (1.676 m)   Wt 178 lb (80.7 kg)   LMP 07/20/2023 (Exact Date)   BMI 28.74 kg/m²   Constitutional: Oriented to person, place, and time. No distress.   HEENT:  Normocephalic and atraumatic. Tympanic membranes normal.  Nose normal. Oropharynx is clear and moist.   Eyes: Conjunctivae are normal. PERRLA. No scleral icterus.   Neck: Normal range of motion. Neck supple. No carotid bruits bilaterally. No edema present.  Thyroid mildly enlarged, no discrete masses.    Cardiovascular: Normal rate, regular rhythm and intact distal pulses.  No murmur, rubs or gallops.   Pulmonary/Chest: Effort normal and breath sounds normal. No respiratory distress. No wheezes, rhonchi or rales  Abdominal: Soft. Bowel  sounds are normal. Non tender, no masses, no organomegaly or hernias.  Musculoskeletal: Normal range of motion. No edema and no tenderness.  No effusions.  Lymphadenopathy: No cervical adenopathy.   Neurological: Normal reflexes. No cranial nerve deficit or sensory deficit. Normal muscle tone. Coordination normal.   Skin: Skin is warm and dry. No rash noted. No erythema. No pallor.   Psychiatric: Normal mood and affect.     [facility] Lab Encounter on 12/08/2024   Component Date Value Ref Range Status    TSH 12/08/2024 0.794  0.550 - 4.780 uIU/mL Final    Cholesterol, Total 12/08/2024 189  <200 mg/dL Final    HDL Cholesterol 12/08/2024 84 (H)  40 - 59 mg/dL Final    Triglycerides 12/08/2024 60  30 - 149 mg/dL Final    LDL Cholesterol 12/08/2024 94  <100 mg/dL Final    VLDL 12/08/2024 10  0 - 30 mg/dL Final    Non HDL Chol 12/08/2024 105  <130 mg/dL Final    Patient Fasting for Lipid? 12/08/2024 Yes   Final    Glucose 12/08/2024 89  70 - 99 mg/dL Final    Sodium 12/08/2024 138  136 - 145 mmol/L Final    Potassium 12/08/2024 4.2  3.5 - 5.1 mmol/L Final    Chloride 12/08/2024 107  98 - 112 mmol/L Final    CO2 12/08/2024 28.0  21.0 - 32.0 mmol/L Final    Anion Gap 12/08/2024 3  0 - 18 mmol/L Final    BUN 12/08/2024 14  9 - 23 mg/dL Final    Creatinine 12/08/2024 0.83  0.55 - 1.02 mg/dL Final    Calcium, Total 12/08/2024 9.5  8.7 - 10.4 mg/dL Final    Calculated Osmolality 12/08/2024 286  275 - 295 mOsm/kg Final    eGFR-Cr 12/08/2024 87  >=60 mL/min/1.73m2 Final    AST 12/08/2024 20  <34 U/L Final    ALT 12/08/2024 14  10 - 49 U/L Final    Alkaline Phosphatase 12/08/2024 101 (H)  39 - 100 U/L Final    Bilirubin, Total 12/08/2024 0.9  0.3 - 1.2 mg/dL Final    Total Protein 12/08/2024 7.0  5.7 - 8.2 g/dL Final    Albumin 12/08/2024 4.4  3.2 - 4.8 g/dL Final    Globulin  12/08/2024 2.6  2.0 - 3.5 g/dL Final    A/G Ratio 12/08/2024 1.7  1.0 - 2.0 Final    Patient Fasting for CMP? 12/08/2024 Yes   Final    WBC 12/08/2024 6.0  4.0 -  11.0 x10(3) uL Final    RBC 12/08/2024 4.65  3.80 - 5.30 x10(6)uL Final    HGB 12/08/2024 14.5  12.0 - 16.0 g/dL Final    HCT 12/08/2024 41.0  35.0 - 48.0 % Final    PLT 12/08/2024 279.0  150.0 - 450.0 10(3)uL Final    MCV 12/08/2024 88.2  80.0 - 100.0 fL Final    MCH 12/08/2024 31.2  26.0 - 34.0 pg Final    MCHC 12/08/2024 35.4  31.0 - 37.0 g/dL Final    RDW 12/08/2024 12.4  % Final    Neutrophil Absolute Prelim 12/08/2024 3.09  1.50 - 7.70 x10 (3) uL Final    Neutrophil Absolute 12/08/2024 3.09  1.50 - 7.70 x10(3) uL Final    Lymphocyte Absolute 12/08/2024 2.22  1.00 - 4.00 x10(3) uL Final    Monocyte Absolute 12/08/2024 0.44  0.10 - 1.00 x10(3) uL Final    Eosinophil Absolute 12/08/2024 0.21  0.00 - 0.70 x10(3) uL Final    Basophil Absolute 12/08/2024 0.06  0.00 - 0.20 x10(3) uL Final    Immature Granulocyte Absolute 12/08/2024 0.02  0.00 - 1.00 x10(3) uL Final    Neutrophil % 12/08/2024 51.1  % Final    Lymphocyte % 12/08/2024 36.8  % Final    Monocyte % 12/08/2024 7.3  % Final    Eosinophil % 12/08/2024 3.5  % Final    Basophil % 12/08/2024 1.0  % Final    Immature Granulocyte % 12/08/2024 0.3  % Final         A/P:    Encounter Diagnoses   Name Primary?    Annual physical exam - flu shot today.  Pap is UTD.  Has order to do mammo, plans to do through [facility].  Declines COVID booster.  Encouraged minimum of 150 min of CV exercise weekly.   Yes    Pure hypercholesterolemia - currently controlled.  Will follow.     Elevated alkaline phosphatase level - check alk phos isoenzynes in Jan.     Enlarged thyroid - check thyroid US.        -  Anxiety - uncontrolled, pt is re-considering starting Wellbutrin.  Another Rx was provided.  If she starts it I have asked her to follow up in 4 weeks for re-evaluation.  Reviewed possible SEs and pt was encouraged to call if any questions or problems.      Orders Placed This Encounter   Procedures    Alk Phos Isoenzyme [E]       Meds & Refills for this Visit:  Requested Prescriptions       No prescriptions requested or ordered in this encounter       Imaging & Consults:  None      No follow-ups on file.    There are no Patient Instructions on file for this visit.    All questions were answered and the patient understands the plan.                  [1]   Allergies  Allergen Reactions    Itraconazole RASH     CAPS   CAPS

## 2024-12-12 ENCOUNTER — OFFICE VISIT (OUTPATIENT)
Dept: INTERNAL MEDICINE CLINIC | Facility: CLINIC | Age: 49
End: 2024-12-12
Payer: COMMERCIAL

## 2024-12-12 VITALS
WEIGHT: 178 LBS | SYSTOLIC BLOOD PRESSURE: 100 MMHG | HEIGHT: 65.98 IN | BODY MASS INDEX: 28.61 KG/M2 | TEMPERATURE: 97 F | DIASTOLIC BLOOD PRESSURE: 72 MMHG | HEART RATE: 64 BPM | RESPIRATION RATE: 18 BRPM

## 2024-12-12 DIAGNOSIS — E78.00 PURE HYPERCHOLESTEROLEMIA: ICD-10-CM

## 2024-12-12 DIAGNOSIS — Z00.00 ANNUAL PHYSICAL EXAM: ICD-10-CM

## 2024-12-12 DIAGNOSIS — E04.9 ENLARGED THYROID: ICD-10-CM

## 2024-12-12 DIAGNOSIS — R74.8 ELEVATED ALKALINE PHOSPHATASE LEVEL: ICD-10-CM

## 2024-12-12 DIAGNOSIS — Z23 NEED FOR INFLUENZA VACCINATION: Primary | ICD-10-CM

## 2024-12-12 RX ORDER — BUPROPION HYDROCHLORIDE 150 MG/1
150 TABLET ORAL DAILY
Qty: 30 TABLET | Refills: 0 | Status: SHIPPED | OUTPATIENT
Start: 2024-12-12

## 2025-01-08 ENCOUNTER — HOSPITAL ENCOUNTER (OUTPATIENT)
Dept: ULTRASOUND IMAGING | Age: 50
Discharge: HOME | End: 2025-01-08
Attending: PHYSICIAN ASSISTANT
Payer: COMMERCIAL

## 2025-01-08 DIAGNOSIS — E04.9 ENLARGED THYROID: ICD-10-CM

## 2025-01-08 PROCEDURE — 76536 US EXAM OF HEAD AND NECK: CPT | Performed by: PHYSICIAN ASSISTANT

## 2025-01-09 DIAGNOSIS — E01.0 THYROMEGALY: Primary | ICD-10-CM

## 2025-02-06 ENCOUNTER — NURSE TRIAGE (OUTPATIENT)
Dept: INTERNAL MEDICINE CLINIC | Facility: CLINIC | Age: 50
End: 2025-02-06

## 2025-02-06 NOTE — TELEPHONE ENCOUNTER
Action Requested: Summary for Provider     []  Critical Lab, Recommendations Needed  [] Need Additional Advice  []   FYI    []   Need Orders  [] Need Medications Sent to Pharmacy  []  Other     SUMMARY: Received call from pt. Patient went to put her socks on and noticed 2 lumps on L foot, pt unsure how long they have been there. One is about the size of a quarter, the other is slightly smaller. Denies redness, warmth, pain. Per pt, asymptomatic. Offered appointment Monday with Brenda Valdovinos, pt unable to come Monday or Tuesday as she will be out of town. Also out of town tomorrow. Scheduled Wednesday 2/12, but advised to be seen in UC with any new/worsening sxs. Patient stated understanding and agreed to plan. Also added appointment to waitlist.      Reason for call: Lump  Onset: pt unsure         Reason for Disposition   Patient wants to be seen    Protocols used: Skin Lump or Localized Swelling-A-OH

## 2025-02-12 ENCOUNTER — OFFICE VISIT (OUTPATIENT)
Dept: INTERNAL MEDICINE CLINIC | Facility: CLINIC | Age: 50
End: 2025-02-12
Payer: COMMERCIAL

## 2025-02-12 ENCOUNTER — HOSPITAL ENCOUNTER (OUTPATIENT)
Dept: GENERAL RADIOLOGY | Age: 50
Discharge: HOME | End: 2025-02-12
Payer: COMMERCIAL

## 2025-02-12 VITALS
WEIGHT: 175 LBS | HEART RATE: 68 BPM | RESPIRATION RATE: 16 BRPM | BODY MASS INDEX: 28.13 KG/M2 | HEIGHT: 66 IN | DIASTOLIC BLOOD PRESSURE: 64 MMHG | SYSTOLIC BLOOD PRESSURE: 116 MMHG

## 2025-02-12 DIAGNOSIS — R22.42 FOOT MASS, LEFT: Primary | ICD-10-CM

## 2025-02-12 DIAGNOSIS — R22.42 FOOT MASS, LEFT: ICD-10-CM

## 2025-02-12 PROCEDURE — 3074F SYST BP LT 130 MM HG: CPT

## 2025-02-12 PROCEDURE — 3008F BODY MASS INDEX DOCD: CPT

## 2025-02-12 PROCEDURE — 3078F DIAST BP <80 MM HG: CPT

## 2025-02-12 PROCEDURE — G2211 COMPLEX E/M VISIT ADD ON: HCPCS

## 2025-02-12 PROCEDURE — 99213 OFFICE O/P EST LOW 20 MIN: CPT

## 2025-02-12 PROCEDURE — 73630 X-RAY EXAM OF FOOT: CPT

## 2025-02-12 NOTE — PROGRESS NOTES
Ira May is a 49 year old female.   Chief Complaint   Patient presents with    Bump     Room 16 b - kb - bump on foot     HPI:    Patient here today for evaluation of two lumps present on left foot, dorsal aspect near 5th metatarsal. She is unsure how long these lumps have been there, she noticed them last week putting socks on. Area is not tender to touch. No discoloration. No prior injury or trauma is is aware of. Last week she though her ankle was hurting but subsided on its own.   Allergies:  Allergies[1]   Current Meds:  Current Outpatient Medications   Medication Sig Dispense Refill    buPROPion  MG Oral Tablet 24 Hr Take 1 tablet (150 mg total) by mouth daily. (Patient not taking: Reported on 2/12/2025) 30 tablet 0        PMH:     Past Medical History:    Abdominal pain    Back pain    Bloating    Blood in urine    Calculus of kidney    Constipation    Diarrhea, unspecified    Dizziness    Flatulence/gas pain/belching    Frequency of urination    Frequent urination    Frequent use of laxatives    Indigestion    Kidney stone    Microhematuria    pseudo    Normal delivery (HCC)    at [facility]    Personal history of urinary calculi    during pregnancy    Stress    Unspecified sinusitis (chronic)    Weight gain       ROS:   Review of Systems   Constitutional: Negative.    Respiratory: Negative.     Cardiovascular: Negative.    Neurological: Negative.             PHYSICAL EXAM:    /64   Pulse 68   Resp 16   Ht 5' 6\" (1.676 m)   Wt 175 lb (79.4 kg)   LMP 07/20/2023 (Exact Date)   BMI 28.25 kg/m²   Physical Exam  Constitutional:       Appearance: Normal appearance.   Cardiovascular:      Pulses:           Dorsalis pedis pulses are 3+ on the left side.   Musculoskeletal:      Left foot: Normal range of motion. Deformity (mass to dorsal aspect between ankle and 5 metatarsal) present.   Feet:      Left foot:      Skin integrity: Skin integrity normal.      Toenail Condition: Left toenails are  normal.   Neurological:      Mental Status: She is alert.        ASSESSMENT/ PLAN:   1. Foot mass, left  Discussed checking xray, if negative would recommend ultrasound. Keep foot elevated when able. Can apply cold compress. NSAID for pain management though currently no symptoms. Pending imaging findings we discussed seeing podiatry for consult.   - XR FOOT, COMPLETE (MIN 3 VIEWS), LEFT (CPT=73630); Future  - US FOOT LEFT COMPLETE (CPT=76881); Future      Health Maintenance Due   Topic Date Due    COVID-19 Vaccine (5 - 2024-25 season) 09/01/2024    Annual Depression Screening  01/01/2025    Mammogram  02/21/2025     Pt indicates understanding and agrees to the plan.     Follow up as needed    CELINA Olivares          [1]   Allergies  Allergen Reactions    Itraconazole RASH     CAPS   CAPS

## 2025-02-13 ENCOUNTER — TELEPHONE (OUTPATIENT)
Dept: INTERNAL MEDICINE CLINIC | Facility: CLINIC | Age: 50
End: 2025-02-13

## 2025-02-13 NOTE — TELEPHONE ENCOUNTER
Received call from pt. Patient seen by Brenda Valdovinos yesterday for bump on foot. Patient scheduled to get US done tomorrow, but developed \"discomfort\" on L shin and some intermittent numbness in L foot. Patient stated it feels like she has shin splints. When asked about swelling/redness, pt did not believe so, currently driving so cannot confirm but did not notice any last time she looked at her leg/foot. Patient stated bump that was evaluated was \"greyish\" in color, unsure if this is how it looked yesterday. Patient questioning if bump is pushing on nerve. Patient wanted to know if she should proceed with US. Advised to proceed with US, may need re-eval given new sxs. Will update Brenda Valdovinos and let her know. Patient agreeable.     Brenda Valdovinos- pt developed mild numbness in L foot along with \"aching\" in L shin. Patient stated it feels like shin splints. Advised to proceed with US as scheduled. Need re-eval for these sxs if they continue?

## 2025-02-13 NOTE — TELEPHONE ENCOUNTER
Called and spoke to pt. Advised to proceed with US, pt scheduled tomorrow, 3:00 at [facility].     ALTON Valdovinos

## 2025-02-13 NOTE — TELEPHONE ENCOUNTER
Would still pursue ultrasound. Was she able to schedule with [facility]? If not I will reorder as stat imaging.

## 2025-02-15 ENCOUNTER — HOSPITAL ENCOUNTER (EMERGENCY)
Facility: HOSPITAL | Age: 50
Discharge: LEFT BEFORE DISCHARGE | End: 2025-02-15
Payer: COMMERCIAL

## 2025-02-15 VITALS
BODY MASS INDEX: 28.13 KG/M2 | WEIGHT: 175 LBS | RESPIRATION RATE: 18 BRPM | OXYGEN SATURATION: 98 % | TEMPERATURE: 99 F | DIASTOLIC BLOOD PRESSURE: 74 MMHG | HEART RATE: 63 BPM | SYSTOLIC BLOOD PRESSURE: 155 MMHG | HEIGHT: 66 IN

## 2025-02-16 ENCOUNTER — HOSPITAL ENCOUNTER (OUTPATIENT)
Age: 50
Discharge: HOME | End: 2025-02-16
Payer: COMMERCIAL

## 2025-02-16 VITALS
SYSTOLIC BLOOD PRESSURE: 119 MMHG | HEART RATE: 80 BPM | RESPIRATION RATE: 18 BRPM | WEIGHT: 175 LBS | TEMPERATURE: 98 F | OXYGEN SATURATION: 98 % | BODY MASS INDEX: 28.13 KG/M2 | HEIGHT: 66 IN | DIASTOLIC BLOOD PRESSURE: 83 MMHG

## 2025-02-16 DIAGNOSIS — M79.89 FOOT SWELLING: Primary | ICD-10-CM

## 2025-02-16 PROCEDURE — 99213 OFFICE O/P EST LOW 20 MIN: CPT | Performed by: NURSE PRACTITIONER

## 2025-02-16 NOTE — ED PROVIDER NOTES
Patient Seen in: Immediate Care [facility]      History     Chief Complaint   Patient presents with    Other     Stated Complaint: Bumps on foot    Subjective:   49-year-old female presents to immediate care for nodule to her left foot.  Patient has seen primary care regarding this area has had an x-ray ultrasound was performed on Friday however she does not have the results as they were performed at an outpatient imaging clinic.  Patient states she started to google today and was concerned for a foot aneurysm versus neoplasm.  She denies any new trauma or injury, denies any new sensations of pain.              Objective:     Past Medical History:    Abdominal pain    Back pain    Bloating    Blood in urine    Calculus of kidney    Constipation    Diarrhea, unspecified    Dizziness    Flatulence/gas pain/belching    Frequency of urination    Frequent urination    Frequent use of laxatives    Indigestion    Kidney stone    Microhematuria    pseudo    Normal delivery (HCC)    at [place] [facility]    Personal history of urinary calculi    during pregnancy    Stress    Unspecified sinusitis (chronic)    Weight gain              Past Surgical History:   Procedure Laterality Date    Cystoscopy,+ureteroscopy Right 5/22/17    Cysto, Rt URS, Rt RGP, stone was already passed, no stent placement -     Cystoscopy,insert ureteral stent Left 07/2018    left ureter stent placement for large proximal stone, Dr. Leach    Esophagoscopy,biopsy  1/4/2012    Lithotripsy Left 08/09/2018    left ESWL with cysto stent removal, ASC    Other surgical history  2018    kidney stones                 Social History     Socioeconomic History    Marital status:    Tobacco Use    Smoking status: Former    Smokeless tobacco: Never   Vaping Use    Vaping status: Never Used   Substance and Sexual Activity    Alcohol use: Yes     Alcohol/week: 2.0 - 3.0 standard drinks of alcohol     Types: 2 - 3 Standard drinks or equivalent per  week     Comment: cage done 7/8/20    Drug use: No    Sexual activity: Yes   Other Topics Concern    Caffeine Concern Yes     Comment: 3-4 cups a day    Exercise Yes     Comment: 3 x a week              Review of Systems   Constitutional: Negative.    Respiratory: Negative.     Cardiovascular: Negative.    Gastrointestinal: Negative.    Skin: Negative.    Neurological: Negative.        Positive for stated complaint: Bumps on foot  Other systems are as noted in HPI.  Constitutional and vital signs reviewed.      All other systems reviewed and negative except as noted above.    Physical Exam     ED Triage Vitals [02/16/25 1027]   /83   Pulse 80   Resp 18   Temp 98 °F (36.7 °C)   Temp src Oral   SpO2 98 %   O2 Device None (Room air)       Current Vitals:   Vital Signs  BP: 119/83  Pulse: 80  Resp: 18  Temp: 98 °F (36.7 °C)  Temp src: Oral    Oxygen Therapy  SpO2: 98 %  O2 Device: None (Room air)        Physical Exam  Vitals and nursing note reviewed.   Constitutional:       General: She is not in acute distress.  HENT:      Head: Normocephalic.   Cardiovascular:      Rate and Rhythm: Normal rate.   Pulmonary:      Effort: Pulmonary effort is normal.   Musculoskeletal:         General: Normal range of motion.        Feet:    Feet:      Comments: Palpable nodule overlying the fifth metatarsal, there is no fluctuance redness or erythema.  Strong pedal pulse noted with palpation  Skin:     General: Skin is warm and dry.   Neurological:      General: No focal deficit present.      Mental Status: She is alert and oriented to person, place, and time.           ED Course   Labs Reviewed - No data to display                MDM        Medical Decision Making  Pertinent Labs & Imaging studies reviewed. (See chart for details).  Patient coming in with foot nodule/swelling.   Differential diagnosis includes ganglion cyst, DVT     Patient is comfortable with plan of care.     Overall Pt looks good. Non-toxic, well-hydrated and  in no respiratory distress. Vital signs are reassuring. Exam is reassuring. I do not believe pt requires and additional diagnostic studies or intervention. I believe pt can be discharged home to continue evaluation as an outpatient. Follow-up provider given. Discharge instructions given and reviewed. Return for any problems. All understand and agrees with the plan.        Problems Addressed:  Foot swelling: acute illness or injury    Amount and/or Complexity of Data Reviewed  Discussion of management or test interpretation with external provider(s): Discussed patient's plan of care with primary care physician Dr. Colunga via telephone.  Advised follow-up with podiatry and obtain ultrasound results tomorrow        Disposition and Plan     Clinical Impression:  1. Foot swelling         Disposition:  Discharge  2/16/2025 10:58 am    Follow-up:  Schriedel, Adam, MD  [address]  [facility] [address]  [phone]          Pati Trinh DPM  [address] [place] [address]  [phone]          Alli Brown DPM  [address] [address]  [phone]                Medications Prescribed:  There are no discharge medications for this patient.          Supplementary Documentation:

## 2025-02-16 NOTE — ED INITIAL ASSESSMENT (HPI)
Patient ambulatory to triage amaro sts she noticed bumps on left foot noticed 1 week ago. Seen by her dr on Wednesday and they did an xray. Had an ultrasound done yesterday but doesn't have results.

## 2025-02-16 NOTE — ED INITIAL ASSESSMENT (HPI)
Pt c/o raised area to her lateral left foot.  Pt c/o tingling to her left leg. Pt had an ultrasound of her left foot on Friday pt has not received the result. Pt has had an xray of her left foot. Pt is concerned about circulation. Pt foot is warm , pink and dry. No discoloration noted. Pt has a strong pedal pulse.

## 2025-02-17 ENCOUNTER — TELEPHONE (OUTPATIENT)
Dept: INTERNAL MEDICINE CLINIC | Facility: CLINIC | Age: 50
End: 2025-02-17

## 2025-02-17 NOTE — TELEPHONE ENCOUNTER
[facility]  faxed [phone] ultrasound  from 2/14/25. Placed  in BD's bin for review .    2/18/25 BD returned document after review  and  now is placed in the scan bin.

## 2025-02-17 NOTE — TELEPHONE ENCOUNTER
Patient is calling to check on the status she stated results were faxed to us on Friday. She will have them refaxed.

## 2025-02-18 DIAGNOSIS — M67.472 GANGLION CYST OF LEFT FOOT: Primary | ICD-10-CM

## 2025-02-18 DIAGNOSIS — R22.42 FOOT MASS, LEFT: ICD-10-CM

## 2025-02-19 ENCOUNTER — TELEPHONE (OUTPATIENT)
Dept: INTERNAL MEDICINE CLINIC | Facility: CLINIC | Age: 50
End: 2025-02-19

## 2025-02-19 NOTE — TELEPHONE ENCOUNTER
[facility] faxed over report for review and placed in BD's bin for review.

## 2025-03-10 ENCOUNTER — TELEPHONE (OUTPATIENT)
Dept: INTERNAL MEDICINE CLINIC | Facility: CLINIC | Age: 50
End: 2025-03-10

## 2025-03-10 NOTE — TELEPHONE ENCOUNTER
Received call from pt asking about labwork prior to pre-op on Thursday. Informed her labs with be discussed/ordered at office visit if needed for clearance. Patient stated understanding and agreed to plan.

## 2025-03-11 NOTE — PROGRESS NOTES
[facility]  PRE OP RISK ASSESSMENT    REASON FOR CONSULT: Pre-op risk assessment for surgical procedure excision of soft tissue mass of left foot planned for 3/26/25 with MAC anesthesia.    REQUESTING PHYSICIAN: Dr. Omar Sheehan    CHIEF COMPLAINT:   Chief Complaint   Patient presents with    Pre-Op Exam     EJ Rm 2- Pt is here for pre op foot surgery       HISTORY OF PRESENT ILLNESS:   The patient is a 49 year old  female  presents for pre-op evaluation.  Pt has a painful mass of L foot.  US consistent with ganglion cyst.  Surgical removal is planned as above.    Pt is able to walk up 2 flights of stairs without CP or SOB.      Past Medical History:    Past Medical History:    Abdominal pain    Back pain    Bloating    Blood in urine    Calculus of kidney    Constipation    Diarrhea, unspecified    Dizziness    Flatulence/gas pain/belching    Frequency of urination    Frequent urination    Frequent use of laxatives    Indigestion    Kidney stone    Microhematuria    pseudo    Normal delivery (HCC)    at [place]    Personal history of urinary calculi    during pregnancy    Stress    Unspecified sinusitis (chronic)    Weight gain        Past Surgical History:    Past Surgical History:   Procedure Laterality Date    Cystoscopy,+ureteroscopy Right 5/22/17    Cysto, Rt URS, Rt RGP, stone was already passed, no stent placement -     Cystoscopy,insert ureteral stent Left 07/2018    left ureter stent placement for large proximal stone, Dr. Leach    Esophagoscopy,biopsy  1/4/2012    Lithotripsy Left 08/09/2018    left ESWL with cysto stent removal, ASC    Other surgical history  2018    kidney stones        Current Medications:    No current outpatient medications on file.        Allergies:    Allergies as of 03/13/2025 - Review Complete 03/13/2025   Allergen Reaction Noted    Itraconazole RASH 02/24/2012       SOCIAL HISTORY:   Social History     Socioeconomic History    Marital status:       Spouse name: Not on file    Number of children: Not on file    Years of education: Not on file    Highest education level: Not on file   Occupational History    Not on file   Tobacco Use    Smoking status: Former    Smokeless tobacco: Never   Vaping Use    Vaping status: Never Used   Substance and Sexual Activity    Alcohol use: Yes     Alcohol/week: 2.0 - 3.0 standard drinks of alcohol     Types: 2 - 3 Standard drinks or equivalent per week     Comment: cage done 7/8/20    Drug use: No    Sexual activity: Yes   Other Topics Concern     Service Not Asked    Blood Transfusions Not Asked    Caffeine Concern Yes     Comment: 3-4 cups a day    Occupational Exposure Not Asked    Hobby Hazards Not Asked    Sleep Concern Not Asked    Stress Concern Not Asked    Weight Concern Not Asked    Special Diet Not Asked    Back Care Not Asked    Exercise Yes     Comment: 3 x a week    Bike Helmet Not Asked    Seat Belt Not Asked    Self-Exams Not Asked   Social History Narrative    Not on file     Social Drivers of Health     Food Insecurity: No Food Insecurity (3/13/2025)    NCSS - Food Insecurity     Worried About Running Out of Food in the Last Year: No     Ran Out of Food in the Last Year: No   Transportation Needs: No Transportation Needs (3/13/2025)    NCSS - Transportation     Lack of Transportation: No   Stress: Not on file   Housing Stability: Not At Risk (3/13/2025)    NCSS - Housing/Utilities     Has Housing: Yes     Worried About Losing Housing: No     Unable to Get Utilities: No        FAMILY HISTORY:   Family History   Problem Relation Age of Onset    Diabetes Mother     High Blood Pressure Mother     Hypertension Mother     Other (HTN) Mother     Other (diabetes mellitus) Mother     Cancer Father 60        Leukemia, liver bile duct    Other (leukemia) Father     Other (CLL) Father 60        Chronic lymphoblastic leukemia    Other (alzheimer's disease) Maternal Grandmother     Heart Attack Maternal Grandfather      Other (Acute MI) Maternal Grandfather     Other (HTN) Maternal Grandfather     Other (emphysema) Paternal Grandmother     Cancer Paternal Grandfather         unk    Other (leukemia) Paternal Grandfather     Other (lymphodemia) Daughter         Acute lymphoblastic leukemia        REVIEW OF SYSTEMS:    GENERAL: feels well otherwise  SKIN: denies any unusual skin lesions  HEENT: denies blurred vision or double vision denies nasal congestion  LUNGS: denies shortness of breath with exertion  CARDIOVASCULAR: denies chest pain on exertion  GI: denies abdominal pain, denies heartburn  : denies dysuria, urgency, frequency, hematuria  MUSCULOSKELETAL: denies back pain  NEURO: denies headaches    PRE-OP ROS  NSAIDS/PLATELET INH: Uses prn  DIABETIC MEDICATIONS: N/A  TERESA; N/A  Hx of VTE: N/A    PHYSICAL EXAM:  /64   Pulse 102   Temp 97.2 °F (36.2 °C) (Temporal)   Resp 18   Ht 5' 6\" (1.676 m)   Wt 178 lb 3.2 oz (80.8 kg)   LMP 07/20/2023 (Exact Date)   SpO2 98%   BMI 28.76 kg/m²   GENERAL: Well developed, well nourished,in no apparent distress  SKIN: No rashes,no suspicious lesions  EYES: PERRLA, EOMI,conjunctiva are clear  HEENT: atraumatic, normocephalic,ears and throat are clear  NECK: supple,no adenopathy,no bruits  LUNGS: clear to auscultation  CARDIO: RRR without murmur  GI: good BS's,no masses, HSM or tenderness    EKG:  NSR.  Normal axis.  No acute changes.    LABS:  [facility] Lab Encounter on 03/13/2025   Component Date Value Ref Range Status    Glucose 03/13/2025 83  70 - 99 mg/dL Final    Sodium 03/13/2025 144  136 - 145 mmol/L Final    Potassium 03/13/2025 3.9  3.5 - 5.1 mmol/L Final    Chloride 03/13/2025 107  98 - 112 mmol/L Final    CO2 03/13/2025 24.0  21.0 - 32.0 mmol/L Final    Anion Gap 03/13/2025 13  0 - 18 mmol/L Final    BUN 03/13/2025 14  9 - 23 mg/dL Final    Creatinine 03/13/2025 0.87  0.55 - 1.02 mg/dL Final    Calcium, Total 03/13/2025 9.4  8.7 - 10.6 mg/dL Final    Calculated Osmolality  03/13/2025 298 (H)  275 - 295 mOsm/kg Final    eGFR-Cr 03/13/2025 82  >=60 mL/min/1.73m2 Final    AST 03/13/2025 22  <34 U/L Final    ALT 03/13/2025 12  10 - 49 U/L Final    Alkaline Phosphatase 03/13/2025 95  39 - 100 U/L Final    Bilirubin, Total 03/13/2025 0.6  0.3 - 1.2 mg/dL Final    Total Protein 03/13/2025 7.3  5.7 - 8.2 g/dL Final    Albumin 03/13/2025 4.6  3.2 - 4.8 g/dL Final    Globulin  03/13/2025 2.7  2.0 - 3.5 g/dL Final    A/G Ratio 03/13/2025 1.7  1.0 - 2.0 Final    Patient Fasting for CMP? 03/13/2025 No   Final    WBC 03/13/2025 6.6  4.0 - 11.0 x10(3) uL Final    RBC 03/13/2025 4.68  3.80 - 5.30 x10(6)uL Final    HGB 03/13/2025 14.6  12.0 - 16.0 g/dL Final    HCT 03/13/2025 42.0  35.0 - 48.0 % Final    PLT 03/13/2025 297.0  150.0 - 450.0 10(3)uL Final    MCV 03/13/2025 89.7  80.0 - 100.0 fL Final    MCH 03/13/2025 31.2  26.0 - 34.0 pg Final    MCHC 03/13/2025 34.8  31.0 - 37.0 g/dL Final    RDW 03/13/2025 12.2  % Final    Neutrophil Absolute Prelim 03/13/2025 3.17  1.50 - 7.70 x10 (3) uL Final    Neutrophil Absolute 03/13/2025 3.17  1.50 - 7.70 x10(3) uL Final    Lymphocyte Absolute 03/13/2025 2.70  1.00 - 4.00 x10(3) uL Final    Monocyte Absolute 03/13/2025 0.45  0.10 - 1.00 x10(3) uL Final    Eosinophil Absolute 03/13/2025 0.19  0.00 - 0.70 x10(3) uL Final    Basophil Absolute 03/13/2025 0.07  0.00 - 0.20 x10(3) uL Final    Immature Granulocyte Absolute 03/13/2025 0.02  0.00 - 1.00 x10(3) uL Final    Neutrophil % 03/13/2025 48.0  % Final    Lymphocyte % 03/13/2025 40.9  % Final    Monocyte % 03/13/2025 6.8  % Final    Eosinophil % 03/13/2025 2.9  % Final    Basophil % 03/13/2025 1.1  % Final    Immature Granulocyte % 03/13/2025 0.3  % Final       ASSESSMENT AND PLAN:      Encounter Diagnosis   Name Primary?    Pre-op evaluation - pt is at mild risk for perioperative CV complications and may proceed with the above procedure as planned.  Pt is aware to hold NSAIDs for 7 days prior to procedure.    Yes       Orders Placed This Encounter   Procedures    CBC W Differential W Platelet [E]    Comp Metabolic Panel (14) [E]       Imaging & Consults:  ELECTROCARDIOGRAM, COMPLETE    Traci Lobo PA-C

## 2025-03-13 ENCOUNTER — OFFICE VISIT (OUTPATIENT)
Dept: INTERNAL MEDICINE CLINIC | Facility: CLINIC | Age: 50
End: 2025-03-13
Payer: COMMERCIAL

## 2025-03-13 ENCOUNTER — LAB ENCOUNTER (OUTPATIENT)
Dept: LAB | Age: 50
End: 2025-03-13
Attending: PHYSICIAN ASSISTANT
Payer: COMMERCIAL

## 2025-03-13 VITALS
OXYGEN SATURATION: 98 % | DIASTOLIC BLOOD PRESSURE: 64 MMHG | TEMPERATURE: 97 F | BODY MASS INDEX: 28.64 KG/M2 | SYSTOLIC BLOOD PRESSURE: 110 MMHG | HEIGHT: 66 IN | RESPIRATION RATE: 18 BRPM | HEART RATE: 102 BPM | WEIGHT: 178.19 LBS

## 2025-03-13 DIAGNOSIS — Z01.818 PRE-OP EVALUATION: ICD-10-CM

## 2025-03-13 DIAGNOSIS — Z01.818 PRE-OP EVALUATION: Primary | ICD-10-CM

## 2025-03-13 DIAGNOSIS — R74.8 ELEVATED ALKALINE PHOSPHATASE LEVEL: ICD-10-CM

## 2025-03-13 LAB
ALBUMIN SERPL-MCNC: 4.6 G/DL (ref 3.2–4.8)
ALBUMIN/GLOB SERPL: 1.7 {RATIO} (ref 1–2)
ALP LIVER SERPL-CCNC: 95 U/L
ALT SERPL-CCNC: 12 U/L
ANION GAP SERPL CALC-SCNC: 13 MMOL/L (ref 0–18)
AST SERPL-CCNC: 22 U/L (ref ?–34)
ATRIAL RATE: 74 BPM
BASOPHILS # BLD AUTO: 0.07 X10(3) UL (ref 0–0.2)
BASOPHILS NFR BLD AUTO: 1.1 %
BILIRUB SERPL-MCNC: 0.6 MG/DL (ref 0.3–1.2)
BUN BLD-MCNC: 14 MG/DL (ref 9–23)
CALCIUM BLD-MCNC: 9.4 MG/DL (ref 8.7–10.6)
CHLORIDE SERPL-SCNC: 107 MMOL/L (ref 98–112)
CO2 SERPL-SCNC: 24 MMOL/L (ref 21–32)
CREAT BLD-MCNC: 0.87 MG/DL
EGFRCR SERPLBLD CKD-EPI 2021: 82 ML/MIN/1.73M2 (ref 60–?)
EOSINOPHIL # BLD AUTO: 0.19 X10(3) UL (ref 0–0.7)
EOSINOPHIL NFR BLD AUTO: 2.9 %
ERYTHROCYTE [DISTWIDTH] IN BLOOD BY AUTOMATED COUNT: 12.2 %
FASTING STATUS PATIENT QL REPORTED: NO
GLOBULIN PLAS-MCNC: 2.7 G/DL (ref 2–3.5)
GLUCOSE BLD-MCNC: 83 MG/DL (ref 70–99)
HCT VFR BLD AUTO: 42 %
HGB BLD-MCNC: 14.6 G/DL
IMM GRANULOCYTES # BLD AUTO: 0.02 X10(3) UL (ref 0–1)
IMM GRANULOCYTES NFR BLD: 0.3 %
LYMPHOCYTES # BLD AUTO: 2.7 X10(3) UL (ref 1–4)
LYMPHOCYTES NFR BLD AUTO: 40.9 %
MCH RBC QN AUTO: 31.2 PG (ref 26–34)
MCHC RBC AUTO-ENTMCNC: 34.8 G/DL (ref 31–37)
MCV RBC AUTO: 89.7 FL
MONOCYTES # BLD AUTO: 0.45 X10(3) UL (ref 0.1–1)
MONOCYTES NFR BLD AUTO: 6.8 %
NEUTROPHILS # BLD AUTO: 3.17 X10 (3) UL (ref 1.5–7.7)
NEUTROPHILS # BLD AUTO: 3.17 X10(3) UL (ref 1.5–7.7)
NEUTROPHILS NFR BLD AUTO: 48 %
OSMOLALITY SERPL CALC.SUM OF ELEC: 298 MOSM/KG (ref 275–295)
P AXIS: 55 DEGREES
P-R INTERVAL: 154 MS
PLATELET # BLD AUTO: 297 10(3)UL (ref 150–450)
POTASSIUM SERPL-SCNC: 3.9 MMOL/L (ref 3.5–5.1)
PROT SERPL-MCNC: 7.3 G/DL (ref 5.7–8.2)
Q-T INTERVAL: 382 MS
QRS DURATION: 80 MS
QTC CALCULATION (BEZET): 424 MS
R AXIS: 48 DEGREES
RBC # BLD AUTO: 4.68 X10(6)UL
SODIUM SERPL-SCNC: 144 MMOL/L (ref 136–145)
T AXIS: 64 DEGREES
VENTRICULAR RATE: 74 BPM
WBC # BLD AUTO: 6.6 X10(3) UL (ref 4–11)

## 2025-03-13 PROCEDURE — 80053 COMPREHEN METABOLIC PANEL: CPT | Performed by: PHYSICIAN ASSISTANT

## 2025-03-13 PROCEDURE — 85025 COMPLETE CBC W/AUTO DIFF WBC: CPT | Performed by: PHYSICIAN ASSISTANT

## 2025-03-18 ENCOUNTER — TELEPHONE (OUTPATIENT)
Dept: INTERNAL MEDICINE CLINIC | Facility: CLINIC | Age: 50
End: 2025-03-18

## 2025-03-18 NOTE — TELEPHONE ENCOUNTER
Pre op clearance note, labs, and EKG was faxed to [place] surgeon office, received a confirmed transmission report. Place in the red folder under the completed tab.

## 2025-06-09 ENCOUNTER — PATIENT MESSAGE (OUTPATIENT)
Dept: INTERNAL MEDICINE CLINIC | Facility: CLINIC | Age: 50
End: 2025-06-09

## 2025-06-10 NOTE — TELEPHONE ENCOUNTER
Traci Lobo PA-C - Please advise if you need to see patient         LOV -  3/13/25 Pre op Ganglion cyst      LOV  - 12/12/24                        Need for influenza vaccination +4 more   Physical    Physica   - Anxiety - uncontrolled, pt is re-considering starting Wellbutrin. Another Rx was provided. If she starts it I have asked her to follow up in 4 weeks for re-evaluation. Reviewed possible SEs and pt was encouraged to call if any questions or problems.     Patient never started medication

## 2025-06-12 RX ORDER — BUPROPION HYDROCHLORIDE 150 MG/1
150 TABLET ORAL DAILY
Qty: 30 TABLET | Refills: 0 | Status: SHIPPED | OUTPATIENT
Start: 2025-06-12

## 2025-07-03 ENCOUNTER — PATIENT MESSAGE (OUTPATIENT)
Dept: INTERNAL MEDICINE CLINIC | Facility: CLINIC | Age: 50
End: 2025-07-03

## (undated) DEVICE — ADHESIVE MASTISOL 2/3CC VL

## (undated) DEVICE — HYDROGEL COATED URETERAL DILATOR: Brand: NOTTINGHAM ONE-STEP

## (undated) DEVICE — STENT URET 6F 26CM WO GW INL
Type: IMPLANTABLE DEVICE | Site: URETER | Status: NON-FUNCTIONAL
Removed: 2018-07-24

## (undated) DEVICE — 3M™ TEGADERM™ TRANSPARENT FILM DRESSING, 1626W, 4 IN X 4-3/4 IN (10 CM X 12 CM), 50 EACH/CARTON, 4 CARTON/CASE: Brand: 3M™ TEGADERM™

## (undated) DEVICE — OPEN-END FLEXI-TIP URETERAL CATHETER: Brand: FLEXI-TIP

## (undated) DEVICE — SEAL Y BIOPSY PORT P6R SCOPE

## (undated) DEVICE — NITINOL WIRE STR 035

## (undated) DEVICE — SOL  .9 3000ML

## (undated) DEVICE — SYRINGE 20CC LL TIP

## (undated) DEVICE — Device

## (undated) DEVICE — 3M(TM) TEGADERM(TM) TRANSPARENT FILM DRESSING FRAME STYLE 9505W: Brand: 3M™ TEGADERM™

## (undated) DEVICE — STERILE POLYISOPRENE POWDER-FREE SURGICAL GLOVES: Brand: PROTEXIS

## (undated) DEVICE — KENDALL SCD EXPRESS SLEEVES, KNEE LENGTH, MEDIUM: Brand: KENDALL SCD

## (undated) DEVICE — CYSTO CDS-LF: Brand: MEDLINE INDUSTRIES, INC.

## (undated) NOTE — LETTER
[facility] [address], [address]    Consent for Operation    Date: July 24, 2018                                           Time: _______________    1.  I authorize the performance upon Ellie Feeling the following operation:    cys revealed by the pictures or by descriptive texts accompanying them. If the procedure has been videotaped, the surgeon will obtain the original videotape. The hospital will not be responsible for storage or maintenance of this tape.     6. For the purpose of THAT MY DOCTOR PROVIDED ME WITH THE ABOVE EXPLANATIONS, THAT ALL BLANKS OR STATEMENTS REQUIRING INSERTION OR COMPLETION WERE FILLED IN.     Signature of Patient:   ___________________________    When the patient is a minor or mentally incompetent to give co supplements, and pills I can buy without a prescription (including street drugs/illegal medications). Failure to inform my anesthesiologist about these medicines may increase my risk of anesthetic complications.   · If I am allergic to anything or have had Anesthesiologist Signature     Date   Time  I have discussed the procedure and information above with the patient (or patient’s representative) and answered their questions. The patient or their representative has agreed to have anesthesia services.     ___

## (undated) NOTE — MR AVS SNAPSHOT
[facility]  [address] [facility] [address] [phone]               Thank you for choosing us for your health care visit with La Lozano PA-C.   We are glad to serve you and happy to provide you with this summary of y Generic drug:  Levonorgestrel-Ethinyl Estrad   Take 1 tablet by mouth daily. Nitrofurantoin Monohyd Macro 100 MG Caps   Take 1 capsule (100 mg total) by mouth 2 (two) times daily.    Commonly known as:  MACROBID                Where to Get Your Me

## (undated) NOTE — ED AVS SNAPSHOT
Sirisha Davila   MRN: [id number]    Department:  [facility] Emergency Department   Date of Visit:  7/21/2018           Disclosure     Insurance plans vary and the physician(s) referred by the ER may not be covered by your plan.  Please contact your tell this physician (or your personal doctor if your instructions are to return to your personal doctor) about any new or lasting problems. The primary care or specialist physician will see patients referred from the [facility] Emergency Department.  Brittany Reddy

## (undated) NOTE — ED AVS SNAPSHOT
[facility] Emergency Department    [place]  [address] [address]    Phone:  [phone]    Fax:  [address] [place] [address]   MRN: [id number]    Department:  [facility] Emergency Department   Date of Visit:  5/25/2 Quantity:  6 tablet   Commonly known as:  BACTRIM DS   Take 1 tablet by mouth 2 (two) times daily. Ask about: Should I take this medication?                Discharge References/Attachments     ABDOMINAL PAIN, UNKNOWN CAUSE, (FEMALE) (ENGLISH)      Disclos IF THERE IS ANY CHANGE OR WORSENING OF YOUR CONDITION, CALL YOUR PRIMARY CARE PHYSICIAN AT ONCE OR RETURN IMMEDIATELY TO THE EMERGENCY DEPARTMENT.     If you have been prescribed any medication(s), please fill your prescription right away and begin taking t Patient [address] to help you get signed up for insurance coverage. Patient [address] is a Federal Navigator program that can help with your Affordable Care Act coverage, as well as all types of Medicaid plans.   To get signed up and covere

## (undated) NOTE — ED AVS SNAPSHOT
[facility] Emergency Department    [place]  [address] [address]    Phone:  [phone]    Fax:  [address] [place] [address]   MRN: [id number]    Department:  [facility] Emergency Department   Date of Visit:  5/25/2 IF THERE IS ANY CHANGE OR WORSENING OF YOUR CONDITION, CALL YOUR PRIMARY CARE PHYSICIAN AT ONCE OR RETURN IMMEDIATELY TO THE EMERGENCY DEPARTMENT.     If you have been prescribed any medication(s), please fill your prescription right away and begin taking t

## (undated) NOTE — MR AVS SNAPSHOT
[facility] [address] [id number] [facility] [address] [phone]               Thank you for choosing us for your health care visit with LUBNA Rojas.   We are glad to serve you and happy to provide you with this summary of y not need to be treated with antibiotics. Until you receive the results of the strep test, you should stay home from work. If your child is being tested, he or she should stay home from school. Home care  · Rest at home.  Drink plenty of fluids so you won't Follow up with your healthcare provider or our staff if you don't get better over the next week. When to seek medical advice  Call your healthcare provider right away if any of these occur:  · Fever as directed by your healthcare provider.  For children, s Kit Lot # [id number] Numeric    Kit Expiration Date 9/30/2018 Date                  [employer]     Call the [employer] for assistance with your inactive [employer] account    If you have questions, you can call [phone] to talk to our [facility] Staff.

## (undated) NOTE — MR AVS SNAPSHOT
[facility] [address], [address] [place] [address]                    After Visit Summary   1/24/2017    Alex Rainey    MRN: [id number]           Visit Information        Provider Department Dept Phone    1/24/20 Visit [email or web address] to learn more.

## (undated) NOTE — MR AVS SNAPSHOT
[facility] [address] [facility] [address] [phone]               Thank you for choosing us for your health care visit with LUBNA Sam.   We are glad to serve you and happy to provide you with this summar Support Staff. Remember, [employer] is NOT to be used for urgent needs. For medical emergencies, dial 911. Visit [email or web address] to learn more.            Visit [facility] online at  [email or web address]